# Patient Record
Sex: FEMALE | Race: WHITE | Employment: FULL TIME | URBAN - METROPOLITAN AREA
[De-identification: names, ages, dates, MRNs, and addresses within clinical notes are randomized per-mention and may not be internally consistent; named-entity substitution may affect disease eponyms.]

---

## 2017-01-19 RX ORDER — TRAZODONE HYDROCHLORIDE 100 MG/1
TABLET ORAL
Qty: 60 TABLET | Refills: 0 | Status: SHIPPED | OUTPATIENT
Start: 2017-01-19 | End: 2017-02-24 | Stop reason: SDUPTHER

## 2017-02-24 RX ORDER — TRAZODONE HYDROCHLORIDE 100 MG/1
TABLET ORAL
Qty: 60 TABLET | Refills: 0 | Status: SHIPPED | OUTPATIENT
Start: 2017-02-24 | End: 2017-11-29

## 2018-08-17 ENCOUNTER — HOSPITAL ENCOUNTER (EMERGENCY)
Age: 43
Discharge: HOME | End: 2018-08-17
Attending: EMERGENCY MEDICINE
Payer: MEDICAID

## 2018-08-17 ENCOUNTER — APPOINTMENT (OUTPATIENT)
Dept: ULTRASOUND IMAGING | Age: 43
End: 2018-08-17
Payer: MEDICAID

## 2018-08-17 VITALS
TEMPERATURE: 98.6 F | DIASTOLIC BLOOD PRESSURE: 88 MMHG | HEART RATE: 90 BPM | OXYGEN SATURATION: 100 % | WEIGHT: 180 LBS | BODY MASS INDEX: 25.2 KG/M2 | SYSTOLIC BLOOD PRESSURE: 117 MMHG | RESPIRATION RATE: 14 BRPM | HEIGHT: 71 IN

## 2018-08-17 DIAGNOSIS — R10.9 ABDOMINAL PAIN DURING PREGNANCY, ANTEPARTUM: ICD-10-CM

## 2018-08-17 DIAGNOSIS — N93.9 VAGINA BLEEDING: Primary | ICD-10-CM

## 2018-08-17 DIAGNOSIS — O26.899 ABDOMINAL PAIN DURING PREGNANCY, ANTEPARTUM: ICD-10-CM

## 2018-08-17 LAB
A/G RATIO: 1.5 (ref 1.1–2.2)
ALBUMIN SERPL-MCNC: 4 G/DL (ref 3.4–5)
ALP BLD-CCNC: 89 U/L (ref 40–129)
ALT SERPL-CCNC: 15 U/L (ref 10–40)
ANION GAP SERPL CALCULATED.3IONS-SCNC: 12 MMOL/L (ref 3–16)
AST SERPL-CCNC: 19 U/L (ref 15–37)
BASOPHILS ABSOLUTE: 0.1 K/UL (ref 0–0.2)
BASOPHILS RELATIVE PERCENT: 0.9 %
BILIRUB SERPL-MCNC: <0.2 MG/DL (ref 0–1)
BILIRUBIN URINE: NEGATIVE
BLOOD, URINE: NEGATIVE
BUN BLDV-MCNC: 8 MG/DL (ref 7–20)
CALCIUM SERPL-MCNC: 9 MG/DL (ref 8.3–10.6)
CHLORIDE BLD-SCNC: 105 MMOL/L (ref 99–110)
CLARITY: CLEAR
CO2: 25 MMOL/L (ref 21–32)
COLOR: YELLOW
CREAT SERPL-MCNC: 0.6 MG/DL (ref 0.6–1.1)
EOSINOPHILS ABSOLUTE: 0.2 K/UL (ref 0–0.6)
EOSINOPHILS RELATIVE PERCENT: 2.2 %
GFR AFRICAN AMERICAN: >60
GFR NON-AFRICAN AMERICAN: >60
GLOBULIN: 2.7 G/DL
GLUCOSE BLD-MCNC: 109 MG/DL (ref 70–99)
GLUCOSE URINE: NEGATIVE MG/DL
GONADOTROPIN, CHORIONIC (HCG) QUANT: 564.4 MIU/ML
HCG(URINE) PREGNANCY TEST: POSITIVE
HCT VFR BLD CALC: 40.7 % (ref 36–48)
HEMOGLOBIN: 14.1 G/DL (ref 12–16)
KETONES, URINE: NEGATIVE MG/DL
LEUKOCYTE ESTERASE, URINE: NEGATIVE
LIPASE: 21 U/L (ref 13–60)
LYMPHOCYTES ABSOLUTE: 2.4 K/UL (ref 1–5.1)
LYMPHOCYTES RELATIVE PERCENT: 25.9 %
MCH RBC QN AUTO: 30.5 PG (ref 26–34)
MCHC RBC AUTO-ENTMCNC: 34.7 G/DL (ref 31–36)
MCV RBC AUTO: 87.8 FL (ref 80–100)
MICROSCOPIC EXAMINATION: NORMAL
MONOCYTES ABSOLUTE: 0.5 K/UL (ref 0–1.3)
MONOCYTES RELATIVE PERCENT: 5 %
NEUTROPHILS ABSOLUTE: 6.2 K/UL (ref 1.7–7.7)
NEUTROPHILS RELATIVE PERCENT: 66 %
NITRITE, URINE: NEGATIVE
PDW BLD-RTO: 12.6 % (ref 12.4–15.4)
PH UA: 6
PLATELET # BLD: 261 K/UL (ref 135–450)
PMV BLD AUTO: 7 FL (ref 5–10.5)
POTASSIUM SERPL-SCNC: 3.8 MMOL/L (ref 3.5–5.1)
PROTEIN UA: NEGATIVE MG/DL
RBC # BLD: 4.63 M/UL (ref 4–5.2)
SODIUM BLD-SCNC: 142 MMOL/L (ref 136–145)
SPECIFIC GRAVITY UA: 1.02
TOTAL PROTEIN: 6.7 G/DL (ref 6.4–8.2)
URINE TYPE: NORMAL
UROBILINOGEN, URINE: 0.2 E.U./DL
WBC # BLD: 9.3 K/UL (ref 4–11)

## 2018-08-17 PROCEDURE — 81003 URINALYSIS AUTO W/O SCOPE: CPT

## 2018-08-17 PROCEDURE — 76801 OB US < 14 WKS SINGLE FETUS: CPT

## 2018-08-17 PROCEDURE — 84703 CHORIONIC GONADOTROPIN ASSAY: CPT

## 2018-08-17 PROCEDURE — 80053 COMPREHEN METABOLIC PANEL: CPT

## 2018-08-17 PROCEDURE — 84702 CHORIONIC GONADOTROPIN TEST: CPT

## 2018-08-17 PROCEDURE — 99284 EMERGENCY DEPT VISIT MOD MDM: CPT

## 2018-08-17 PROCEDURE — 83690 ASSAY OF LIPASE: CPT

## 2018-08-17 PROCEDURE — 76817 TRANSVAGINAL US OBSTETRIC: CPT

## 2018-08-17 PROCEDURE — 85025 COMPLETE CBC W/AUTO DIFF WBC: CPT

## 2018-08-17 ASSESSMENT — PAIN DESCRIPTION - PAIN TYPE: TYPE: ACUTE PAIN

## 2018-08-17 ASSESSMENT — PAIN DESCRIPTION - ORIENTATION: ORIENTATION: LOWER;LEFT;RIGHT

## 2018-08-17 ASSESSMENT — ENCOUNTER SYMPTOMS
RESPIRATORY NEGATIVE: 1
ABDOMINAL PAIN: 1

## 2018-08-17 ASSESSMENT — PAIN DESCRIPTION - LOCATION: LOCATION: ABDOMEN

## 2018-08-17 ASSESSMENT — PAIN DESCRIPTION - DESCRIPTORS: DESCRIPTORS: CRAMPING

## 2018-08-17 ASSESSMENT — PAIN SCALES - GENERAL: PAINLEVEL_OUTOF10: 7

## 2018-08-17 NOTE — ED PROVIDER NOTES
[address] ED  eMERGENCY dEPARTMENT eNCOUnter        Pt Name: Celso Houser  MRN: [id number]  [place] 1975  Date of evaluation: 8/17/2018  Provider: Jagjit Armando PA-C  PCP: Chaka Morales DO  ED Attending: Carson Sarah MD    [address] [place] [address]       Chief Complaint   Patient presents with    Abdominal Pain     started 2 months ago, low abd \"in the pouch\". HISTORY OF PRESENT ILLNESS   (Location/Symptom, Timing/Onset, Context/Setting, Quality, Duration, Modifying Factors, Severity)  Note limiting factors. Celso Houser is a 36 y.o. female presents complaining of suprapubic abdominal pain for the past couple of months. Onset has been gradual over the past couple of months. Duration is been intermittent. Last menstrual period was 4 months ago however she states that she has had spotting for the past  2 couple of months. She states he feels as though she is pregnant however she is taking tests at home and they've been negative. She does admit to intermittent nausea but denies any emesis. Nothing seems to make her abdominal pain better. Denies any vaginal discharge or vaginal pain or pelvic pain. She denies any vaginal bleeding at this time. No fevers or chills at this time. Nursing Notes were all reviewed and agreed with or any disagreements were addressed  in the HPI. REVIEW OF SYSTEMS    (2-9 systems for level 4, 10 or more for level 5)     Review of Systems   Constitutional: Negative. HENT: Negative. Respiratory: Negative. Cardiovascular: Negative. Gastrointestinal: Positive for abdominal pain. Genitourinary: Negative. Musculoskeletal: Negative. Skin: Negative. Neurological: Negative. Hematological: Negative. Positives and Pertinent negatives as per HPI. Except as noted above in the ROS, all other systems were reviewed and negative.        PAST MEDICAL HISTORY     Past Medical History:   Diagnosis Date    Anxiety     Back pain     DDD (degenerative disc disease)     Depression     Headache(784.0) 3/25/2011    Herniated disc     lumbar         SURGICAL HISTORY       Past Surgical History:   Procedure Laterality Date    ANUS SURGERY      hemerrhoid surgery x 5     SECTION      x2    HUMERUS FRACTURE SURGERY WITH IMPLANT           CURRENT MEDICATIONS       Discharge Medication List as of 2018  3:23 PM      CONTINUE these medications which have NOT CHANGED    Details   erythromycin (ROMYCIN) 5 MG/GM ophthalmic ointment Apply 1/2 inch to right eye 4 times daily. , Disp-1 Tube, R-0, Print      ondansetron (ZOFRAN ODT) 4 MG disintegrating tablet Take 1 tablet by mouth every 8 hours as needed for Nausea or Vomiting, Disp-10 tablet, R-0Print               ALLERGIES     Tramadol and Vicodin [hydrocodone-acetaminophen]    FAMILY HISTORY       Family History   Problem Relation Age of Onset    Cancer Maternal Grandfather           SOCIAL HISTORY       Social History     Social History    Marital status:      Spouse name: N/A    Number of children: N/A    Years of education: N/A     Social History Main Topics    Smoking status: Never Smoker    Smokeless tobacco: Never Used    Alcohol use No    Drug use: No    Sexual activity: Not Asked     Other Topics Concern    None     Social History Narrative    None       SCREENINGS    [place] Coma Scale  Eye Opening: Spontaneous  Best Verbal Response: Oriented  Best Motor Response: Obeys commands  [place] Coma Scale Score: 15        PHYSICAL EXAM    (up to 7 for level 4, 8 or more for level 5)     ED Triage Vitals [18 1000]   BP Temp Temp Source Pulse Resp SpO2 Height Weight   126/80 98.6 °F (37 °C) Oral 93 16 100 % 5' 11\" (1.803 m) 180 lb (81.6 kg)       Physical Exam   Constitutional: She is oriented to person, place, and time. She appears well-developed and well-nourished. HENT:   Head: Normocephalic and atraumatic.    Nose: Nose normal.   Eyes: Right eye exhibits no discharge. Left eye exhibits no discharge. Neck: Normal range of motion. Neck supple. Cardiovascular: Normal rate, regular rhythm and normal heart sounds. Exam reveals no gallop. No murmur heard. Pulmonary/Chest: Effort normal and breath sounds normal. No respiratory distress. She has no wheezes. She has no rales. She exhibits no tenderness. Abdominal: Soft. Bowel sounds are normal. There is no tenderness. Musculoskeletal: Normal range of motion. She exhibits no deformity. Neurological: She is alert and oriented to person, place, and time. Skin: Skin is warm and dry. She is not diaphoretic. Psychiatric: She has a normal mood and affect. Her behavior is normal.   Nursing note and vitals reviewed.       DIAGNOSTIC RESULTS   LABS:    Labs Reviewed   COMPREHENSIVE METABOLIC PANEL - Abnormal; Notable for the following:        Result Value    Glucose 109 (*)     All other components within normal limits    Narrative:     Performed at:  [place] [address],  [place], [facility]   Phone [phone]   CBC WITH AUTO DIFFERENTIAL    Narrative:     Performed at:  [place] [address],  [place], [facility]   Phone [phone]   URINALYSIS    Narrative:     Performed at:  [place] [address],  [place], [facility]   Phone [phone]   PREGNANCY, URINE    Narrative:     Performed at:  [place] [address],  [place], [facility]   Phone [phone]   LIPASE    Narrative:     Performed at:  [place] [address],  [place], [facility]   Phone [phone]   HCG, QUANTITATIVE, PREGNANCY    Narrative:     Performed at:  [facility],  [place], [facility]   Phone [phone]       All other labs were within normal range or not returned as of this dictation. EKG: All EKG's are interpreted by the Emergency Department Physician who either signs or Co-signs this chart in the absence of a cardiologist.  Please see their note for interpretation of EKG. RADIOLOGY:   Non-plain film images such as CT, Ultrasound and MRI are read by the radiologist. Plain radiographic images are visualized and preliminarily interpreted by the  ED Provider with the below findings:        Interpretation per the Radiologist below, if available at the time of this note:    US OB TRANSVAGINAL   Final Result   Unremarkable appearing uterus and ovaries except for a thickened endometrium. No intrauterine or ectopic pregnancy identified         US OB LESS THAN 14 WEEKS SINGLE OR FIRST GESTATION    (Results Pending)     No results found. PROCEDURES   Unless otherwise noted below, none     Procedures    CRITICAL CARE TIME   N/A    CONSULTS:  None      EMERGENCY DEPARTMENT COURSE and DIFFERENTIAL DIAGNOSIS/MDM:   Vitals:    Vitals:    08/17/18 1000 08/17/18 1448   BP: 126/80 117/88   Pulse: 93 90   Resp: 16 14   Temp: 98.6 °F (37 °C)    TempSrc: Oral    SpO2: 100% 100%   Weight: 180 lb (81.6 kg)    Height: 5' 11\" (1.803 m)        Patient was given the following medications:  Medications - No data to display    She presents with persistent suprapubic abdominal pain for the past couple months. On exam she is alert oriented afebrile hemodyanamically stable pulse ox 100%. Abdomen is soft and nontender without bulges or masses noted. She is drinking soda and eating candy in the room however she does complain of being nauseous. Urine pregnancy is positive. We will get hcg quantative level. Will get a pelvic ultrasound to evaluate for her and rule out ectopic pregnancy. Patient did not wait for U/S results. Patient said she needed to  her son from school. She needs to a repeat hcg in 2 days.  I told her she needs to stay and wait for ultrasound results as well as she needs a follow-up to get her hCG rechecked in 2 days to make sure that it is not a threatened . Patient decided she needed to leave and just wanted the paperwork and left AMA. I had a thorough conversation with her discussing why she should stay and she decided against it. The patient tolerated their visit well. They were seen and evaluated by the attending physician who agreed with the assessment and plan. The patient and / or the family were informed of the results of any tests, a time was given to answer questions, a plan was proposed and they agreed with plan. FINAL IMPRESSION      1. Vagina bleeding    2.  Abdominal pain during pregnancy, antepartum          DISPOSITION/PLAN   DISPOSITION        PATIENT REFERRED TO:  Caro Pacheco DO  [address] [place] [address] [phone]      As needed, If symptoms worsen      DISCHARGE MEDICATIONS:  Discharge Medication List as of 2018  3:23 PM          DISCONTINUED MEDICATIONS:  Discharge Medication List as of 2018  3:23 PM                 (Please note that portions of this note were completed with a voice recognition program.  Efforts were made to edit the dictations but occasionally words are mis-transcribed.)    Leelee Agosto PA-C (electronically signed)           Leelee Agosto PA-C  18 1640

## 2018-08-21 ENCOUNTER — APPOINTMENT (OUTPATIENT)
Dept: CT IMAGING | Age: 43
End: 2018-08-21
Payer: MEDICAID

## 2018-08-21 ENCOUNTER — HOSPITAL ENCOUNTER (EMERGENCY)
Age: 43
Discharge: HOME | End: 2018-08-21
Payer: MEDICAID

## 2018-08-21 VITALS
WEIGHT: 180 LBS | OXYGEN SATURATION: 100 % | SYSTOLIC BLOOD PRESSURE: 118 MMHG | RESPIRATION RATE: 18 BRPM | BODY MASS INDEX: 25.2 KG/M2 | HEART RATE: 82 BPM | HEIGHT: 71 IN | DIASTOLIC BLOOD PRESSURE: 72 MMHG | TEMPERATURE: 98.2 F

## 2018-08-21 DIAGNOSIS — R56.9 WITNESSED SEIZURE-LIKE ACTIVITY (HCC): Primary | ICD-10-CM

## 2018-08-21 LAB
A/G RATIO: 1.3 (ref 1.1–2.2)
ALBUMIN SERPL-MCNC: 3.8 G/DL (ref 3.4–5)
ALP BLD-CCNC: 81 U/L (ref 40–129)
ALT SERPL-CCNC: 20 U/L (ref 10–40)
AMPHETAMINE SCREEN, URINE: ABNORMAL
ANION GAP SERPL CALCULATED.3IONS-SCNC: 10 MMOL/L (ref 3–16)
AST SERPL-CCNC: 23 U/L (ref 15–37)
BARBITURATE SCREEN URINE: ABNORMAL
BASOPHILS ABSOLUTE: 0.1 K/UL (ref 0–0.2)
BASOPHILS RELATIVE PERCENT: 0.9 %
BENZODIAZEPINE SCREEN, URINE: POSITIVE
BILIRUB SERPL-MCNC: <0.2 MG/DL (ref 0–1)
BILIRUBIN URINE: NEGATIVE
BLOOD, URINE: NEGATIVE
BUN BLDV-MCNC: 6 MG/DL (ref 7–20)
CALCIUM SERPL-MCNC: 9 MG/DL (ref 8.3–10.6)
CANNABINOID SCREEN URINE: ABNORMAL
CHLORIDE BLD-SCNC: 103 MMOL/L (ref 99–110)
CLARITY: CLEAR
CO2: 25 MMOL/L (ref 21–32)
COCAINE METABOLITE SCREEN URINE: ABNORMAL
COLOR: YELLOW
CREAT SERPL-MCNC: 0.6 MG/DL (ref 0.6–1.1)
EOSINOPHILS ABSOLUTE: 0.3 K/UL (ref 0–0.6)
EOSINOPHILS RELATIVE PERCENT: 2.3 %
GFR AFRICAN AMERICAN: >60
GFR NON-AFRICAN AMERICAN: >60
GLOBULIN: 3 G/DL
GLUCOSE BLD-MCNC: 79 MG/DL (ref 70–99)
GLUCOSE URINE: NEGATIVE MG/DL
GONADOTROPIN, CHORIONIC (HCG) QUANT: 2709 MIU/ML
HCG(URINE) PREGNANCY TEST: POSITIVE
HCT VFR BLD CALC: 39 % (ref 36–48)
HEMOGLOBIN: 13.5 G/DL (ref 12–16)
KETONES, URINE: NEGATIVE MG/DL
LEUKOCYTE ESTERASE, URINE: NEGATIVE
LYMPHOCYTES ABSOLUTE: 2.2 K/UL (ref 1–5.1)
LYMPHOCYTES RELATIVE PERCENT: 20.8 %
Lab: ABNORMAL
MAGNESIUM: 2 MG/DL (ref 1.8–2.4)
MCH RBC QN AUTO: 29.7 PG (ref 26–34)
MCHC RBC AUTO-ENTMCNC: 34.7 G/DL (ref 31–36)
MCV RBC AUTO: 85.5 FL (ref 80–100)
METHADONE SCREEN, URINE: ABNORMAL
MICROSCOPIC EXAMINATION: NORMAL
MONOCYTES ABSOLUTE: 0.5 K/UL (ref 0–1.3)
MONOCYTES RELATIVE PERCENT: 5.1 %
NEUTROPHILS ABSOLUTE: 7.6 K/UL (ref 1.7–7.7)
NEUTROPHILS RELATIVE PERCENT: 70.9 %
NITRITE, URINE: NEGATIVE
OPIATE SCREEN URINE: ABNORMAL
OXYCODONE URINE: ABNORMAL
PDW BLD-RTO: 12.6 % (ref 12.4–15.4)
PH UA: 6
PH UA: 6
PHENCYCLIDINE SCREEN URINE: ABNORMAL
PLATELET # BLD: 267 K/UL (ref 135–450)
PMV BLD AUTO: 6.7 FL (ref 5–10.5)
POTASSIUM SERPL-SCNC: 3.9 MMOL/L (ref 3.5–5.1)
PROPOXYPHENE SCREEN: ABNORMAL
PROTEIN UA: NEGATIVE MG/DL
RBC # BLD: 4.56 M/UL (ref 4–5.2)
SODIUM BLD-SCNC: 138 MMOL/L (ref 136–145)
SPECIFIC GRAVITY UA: >=1.03
TOTAL PROTEIN: 6.8 G/DL (ref 6.4–8.2)
URINE TYPE: NORMAL
UROBILINOGEN, URINE: 0.2 E.U./DL
WBC # BLD: 10.8 K/UL (ref 4–11)

## 2018-08-21 PROCEDURE — 84702 CHORIONIC GONADOTROPIN TEST: CPT

## 2018-08-21 PROCEDURE — 80307 DRUG TEST PRSMV CHEM ANLYZR: CPT

## 2018-08-21 PROCEDURE — 99283 EMERGENCY DEPT VISIT LOW MDM: CPT

## 2018-08-21 PROCEDURE — 80053 COMPREHEN METABOLIC PANEL: CPT

## 2018-08-21 PROCEDURE — 83735 ASSAY OF MAGNESIUM: CPT

## 2018-08-21 PROCEDURE — 84703 CHORIONIC GONADOTROPIN ASSAY: CPT

## 2018-08-21 PROCEDURE — 70450 CT HEAD/BRAIN W/O DYE: CPT

## 2018-08-21 PROCEDURE — 85025 COMPLETE CBC W/AUTO DIFF WBC: CPT

## 2018-08-21 PROCEDURE — 81003 URINALYSIS AUTO W/O SCOPE: CPT

## 2018-08-21 RX ORDER — BUPRENORPHINE AND NALOXONE 8; 2 MG/1; MG/1
1 FILM, SOLUBLE BUCCAL; SUBLINGUAL 2 TIMES DAILY
COMMUNITY
End: 2018-09-17

## 2018-08-21 RX ORDER — HYDROXYZINE 50 MG/1
TABLET, FILM COATED ORAL
COMMUNITY
Start: 2018-08-15 | End: 2018-09-17

## 2018-08-21 RX ORDER — MIRTAZAPINE 30 MG/1
45 TABLET, FILM COATED ORAL NIGHTLY
COMMUNITY
End: 2018-09-17

## 2018-08-21 ASSESSMENT — ENCOUNTER SYMPTOMS
BACK PAIN: 0
VOMITING: 0
DIARRHEA: 0
WHEEZING: 0
COLOR CHANGE: 0
SHORTNESS OF BREATH: 0
ABDOMINAL PAIN: 0
COUGH: 0
NAUSEA: 0

## 2018-08-21 ASSESSMENT — PAIN DESCRIPTION - ORIENTATION: ORIENTATION: RIGHT

## 2018-08-21 ASSESSMENT — PAIN DESCRIPTION - LOCATION: LOCATION: HIP

## 2018-08-21 ASSESSMENT — PAIN SCALES - GENERAL: PAINLEVEL_OUTOF10: 8

## 2018-08-21 NOTE — ED PROVIDER NOTES
[facility]  ED  eMERGENCY dEPARTMENT eNCOUnter        Pt Name: Markus Wood  MRN: [id number]  [place] 1975  Date of evaluation: 8/21/2018  Provider: MARITZA Quevedo - CNP  PCP: Sawyer Alex, [facility]       Chief Complaint   Patient presents with   [place] Fall     a month ago she had a grand mal seizure, since then she has a hard time remembering anything. Pt states she keeps tripping and falling. She found out she is pregnant 3 days ago at [facility] [employer]. She had an ultrasound there and it was too early to see how long. Pt states she is here now because she has had 3 seizures since and last night she is worried about the baby       HISTORY OF PRESENT ILLNESS   (Location/Symptom, Timing/Onset, Context/Setting, Quality, Duration, Modifying Factors, Severity)  Note limiting factors. Markus Wood is a 36 y.o. female who presents today with seizure like activity, states her equilibrium is off and has been falling. She states that yesterday she had seizure activity and fell off a chair yesterday and landed on her right buttocks, but denies hitting her head. She states she is concerned as she does not have history of seizures until one month ago, as this was her first one. She states that she does not like to see doctors. She also found at she was pregnant, found out at [place] three days ago. In regards to her pregnancy, she is G-9, P-4, A-4; she last LMP 5/14/18 (possibly longer), however is denying any vaginal bleeding or discharge. She denies any abdominal pain or cramping. She denies any new medications. Denies any illicit drugs. Denies any additional aggravating or relieving factors. She presents awake, alert and in no acute distress or toxic appearance. Nursing Notes were all reviewed and agreed with or any disagreements were addressed  in the HPI.     REVIEW OF SYSTEMS    (2-9 systems for level 4, 10 or more for level 5)     Review of Systems Constitutional: Negative for chills and fever. HENT: Negative for congestion. Respiratory: Negative for cough, shortness of breath and wheezing. Cardiovascular: Negative for chest pain. Gastrointestinal: Negative for abdominal pain, diarrhea, nausea and vomiting. Genitourinary: Negative for difficulty urinating, dysuria, vaginal bleeding and vaginal discharge. Musculoskeletal: Negative for back pain. Skin: Negative for color change. Neurological: Positive for seizures. Negative for weakness, numbness and headaches. She states she's been having seizure-like activity, has some intermittent confusion but denies any head injury or trauma. Denies any numbness tingling or paresthesias. Psychiatric/Behavioral: Positive for confusion. Positives and Pertinent negatives as per HPI. Except as noted above in the ROS, all other systems were reviewed and negative. PAST MEDICAL HISTORY     Past Medical History:   Diagnosis Date    Anxiety     Back pain     DDD (degenerative disc disease)     Depression     Headache(784.0) 3/25/2011    Herniated disc     lumbar         SURGICAL HISTORY       Past Surgical History:   Procedure Laterality Date    ANUS SURGERY      hemerrhoid surgery x 5     SECTION      x2    HUMERUS FRACTURE SURGERY WITH IMPLANT           CURRENT MEDICATIONS       Discharge Medication List as of 2018  6:39 PM      CONTINUE these medications which have NOT CHANGED    Details   mirtazapine (REMERON) 30 MG tablet Take 45 mg by mouth nightlyHistorical Med      buprenorphine-naloxone (SUBOXONE) 8-2 MG FILM SL film Place 1 Film under the tongue 2 times daily. Lona Richradson Historical Med      hydrOXYzine (ATARAX) 50 MG tablet Historical Med      ondansetron (ZOFRAN ODT) 4 MG disintegrating tablet Take 1 tablet by mouth every 8 hours as needed for Nausea or Vomiting, Disp-10 tablet, R-0Print               ALLERGIES     Tramadol and Vicodin GCS motor subscore is 6. Patient is awake, alert answering questions properly, neurologically intact no focal deficits. No numbness tingling or paresthesias. No saddle anesthesia. No loss of bowel or bladder control or urinary retention. Skin: Skin is warm and dry. She is not diaphoretic. No pallor. Psychiatric: She has a normal mood and affect. Her behavior is normal.   Nursing note and vitals reviewed. DIAGNOSTIC RESULTS   LABS:    Labs Reviewed   COMPREHENSIVE METABOLIC PANEL - Abnormal; Notable for the following:        Result Value    BUN 6 (*)     All other components within normal limits    Narrative:     Performed at:  [place] [address] [address]   Phone [phone]   [address] - Abnormal; Notable for the following:     Benzodiazepine Screen, Urine POSITIVE (*)     All other components within normal limits    Narrative:     Performed at:  [place] [address] [address]   Phone [phone]   CBC WITH AUTO DIFFERENTIAL    Narrative:     Performed at:  [place] [address] [address]   Phone [phone]   URINALYSIS    Narrative:     Performed at:  [place] [address] [address]   Phone [phone]   PREGNANCY, URINE    Narrative:     Performed at:  [place] [address] [address]   Phone [phone]   HCG, QUANTITATIVE, PREGNANCY    Narrative:     Performed at:  [place] [address] [address]   Phone [phone]   MAGNESIUM    Narrative:     Performed at:  [place] [address] [address]   Phone [phone]       All other labs were within normal range or not returned as of this dictation. EKG:  All EKG's are interpreted by the Emergency Department Physician who either signs or Co-signs this chart in the absence of a cardiologist.  Please see their note for interpretation of EKG. RADIOLOGY:   Non-plain film images such as CT, Ultrasound and MRI are read by the radiologist. Plain radiographic images are visualized and preliminarily interpreted by the  ED Provider with the below findings:        Interpretation per the Radiologist below, if available at the time of this note:    CT HEAD WO CONTRAST   Final Result   No acute intracranial abnormality. No results found. PROCEDURES   Unless otherwise noted below, none     Procedures    CRITICAL CARE TIME   N/A    CONSULTS:  IP CONSULT TO NEUROLOGY      EMERGENCY DEPARTMENT COURSE and DIFFERENTIAL DIAGNOSIS/MDM:   Vitals:    Vitals:    08/21/18 1541 08/21/18 1732 08/21/18 1910   BP: 118/67 112/70 118/72   Pulse: 91 83 82   Resp: 20 18 18   Temp: 98.2 °F (36.8 °C)     TempSrc: Oral     SpO2: 100% 100% 100%   Weight: 180 lb (81.6 kg)     Height: 5' 11\" (1.803 m)         Patient was given the following medications:  Medications - No data to display    Patient presents emergency Department with concerns of seizure-like activity. She states that she's been having shaking episodes and family was concerned that she's having seizures. She denies any history of seizures. She also reports that she was pregnant with her fifth child never has no abdominal pain, cramping, vaginal bleeding or discharge. After evaluation and examination the patient I ordered IV access, blood work, urinalysis and a CT scan of the head. CBC shows no sepsis or anemia. Metabolic panel shows no electrolyte disturbances or renal insufficiency. Liver functions are normal.. HCG is 2709. Magnesium level is normal.  Urinalysis shows no acute infection. Drug screen is positive for benzodiazepines. CT scan of the head shows no acute intracranial abnormality.   Patient has unremarkable neurological exam with no acute focal deficits. I did page neurology on-call. At [address] I spoke with Dr. Dede Jules, neurology on-call, we discussed the patient's case at length and he recommended that the patient follow up with the primary care doctor in addition to Kelsey solo, there was discussion me about the patient being evaluated for the first.  However, Patient has a normal repeat neurological exam. At this time there is no indication of head injury, encephalopathy, TIA/CVA, seizures, dehydration, hypoglycemia, mass lesions, metabolic cause, cardiac arrhythmia, PE. Patient is stable throughout ED course and safe for outpatient follow-up. Therefore, shared medical decision was made between the patient myself and we agreed the patient could be discharged home with outpatient follow-up. Patient made aware of treatment plan and verbally understood and agreed. Patient stable for outpatient follow-up with their family doctor in 24-48 hours. The patient tolerated their visit well. Patient was seen and evaluated by myself and attending physician available for consultation. The patient and / or the family were informed of the results of any tests, a time was given to answer questions, a plan was proposed and they agreed with plan. Patient verbalized understanding of discharge instructions and was discharged from the department stable condition. FINAL IMPRESSION      1. Witnessed seizure-like activity [facility])          DISPOSITION/PLAN   DISPOSITION Decision To Discharge 08/21/2018 06:37:52 PM      PATIENT REFERRED TO:  Bernard Estevez DO  [address] [place] [address] [phone]    Schedule an appointment as soon as possible for a visit in 2 days  you need to follow up with your family doctor ASAP for re-evaluation    [facility]  [address]  [phone]  Schedule an appointment as soon as possible for a

## 2018-09-17 ENCOUNTER — TELEPHONE (OUTPATIENT)
Dept: NEUROLOGY | Age: 43
End: 2018-09-17

## 2018-09-17 ENCOUNTER — INITIAL CONSULT (OUTPATIENT)
Dept: NEUROLOGY | Age: 43
End: 2018-09-17

## 2018-09-17 VITALS
OXYGEN SATURATION: 98 % | DIASTOLIC BLOOD PRESSURE: 77 MMHG | BODY MASS INDEX: 29.82 KG/M2 | HEIGHT: 71 IN | WEIGHT: 213 LBS | SYSTOLIC BLOOD PRESSURE: 126 MMHG | HEART RATE: 67 BPM

## 2018-09-17 DIAGNOSIS — Z3A.10 10 WEEKS GESTATION OF PREGNANCY: ICD-10-CM

## 2018-09-17 DIAGNOSIS — R56.9 NEW ONSET SEIZURE (HCC): Primary | ICD-10-CM

## 2018-09-17 PROCEDURE — 99204 OFFICE O/P NEW MOD 45 MIN: CPT | Performed by: PSYCHIATRY & NEUROLOGY

## 2018-09-17 RX ORDER — DIPHENHYDRAMINE HYDROCHLORIDE 25 MG/1
CAPSULE ORAL
Status: ON HOLD | COMMUNITY
Start: 2018-09-06 | End: 2021-10-30 | Stop reason: HOSPADM

## 2018-09-17 RX ORDER — PROMETHAZINE HYDROCHLORIDE 25 MG/1
TABLET ORAL
COMMUNITY
Start: 2018-09-14

## 2018-09-17 RX ORDER — PNV NO.95/FERROUS FUM/FOLIC AC 28MG-0.8MG
TABLET ORAL
COMMUNITY
Start: 2018-09-06

## 2018-09-17 NOTE — PROGRESS NOTES
The patient is a 36y.o. years old female who was referred by David Rivero DO  for consultation regarding new onset seizure. The patient describes new onset seizure that started in July of this year. The patient was at home back in July when she was witnessed by her family to have a generalized motor seizure with tongue biting and postictal state. Patient has no recollection of the whole event. This was her first seizure. She was seen in the ED and had unremarkable workup with CT head and blood testing. UDS was positive for benzodiazepines. Patient then discovered that she was pregnant. She is scheduled for ultrasound next week. Seizures description:  Aura: none  Semiology: GTC with tongue biting  Frequency: once  Duration: minutes. Post ictal state: yes  Triggers: no  History of febrile convulsions: no   History of major childhood illnesses or febrile illnesses: no  Prior MRI:  No. She had normal CT head last month  EEG: no  AED: no  Family history of epilepsy: no  history of major head trauma: yes  Use of ETOH or drugs: no  Sleep disturbance: no  History of status: no  Driving: yes  Depression or STEWART: no  Job: yes. Desk job. Other comorbid diseases: She is currently pregnant in her first trimester       Past Medical History:   Diagnosis Date    Anxiety     Back pain     DDD (degenerative disc disease)     Depression     Headache(784.0) 3/25/2011    Herniated disc     lumbar    New onset seizure ([address] 9/17/2018     Prior to Visit Medications    Medication Sig Taking?  Authorizing Provider   Prenatal Vit-Fe Fumarate-FA (PRENATAL VITAMINS) 28-0.8 MG TABS  Yes Historical Provider, MD   promethazine (PHENERGAN) 25 MG tablet  Yes Historical Provider, MD   vitamin B-6 (PYRIDOXINE) 25 MG tablet  Yes Historical Provider, MD     Allergies   Allergen Reactions    Tramadol     Vicodin [Hydrocodone-Acetaminophen] Nausea And Vomiting     Social History   Substance Use Topics    Smoking status: Never Smoker    Smokeless tobacco: Never Used    Alcohol use No     Family History   Problem Relation Age of Onset    Cancer Maternal Grandfather      Past Surgical History:   Procedure Laterality Date    ANUS SURGERY      hemerrhoid surgery x 5     SECTION      x2    HUMERUS FRACTURE SURGERY WITH IMPLANT           ROS: A 10-12 system review of constitutional, cardiovascular, respiratory, musculoskeletal, endocrine, skin, SHEENT, genitourinary, psychiatric and neurologic systems was obtained and updated today and is unremarkable except as mentioned in my HPI    Exam:  Constitutional:   Vitals:    18 1148   BP: 126/77   Pulse: 67   SpO2: 98%   Weight: 213 lb (96.6 kg)   Height: 5' 11\" (1.803 m)         General appearance: well-nourished. Eye: No icterus. No blurring of optic disc. Neck: supple  Cardiovascular: No carotid bruit. Heart: S1, S2         No lower leg edema with good pulsation. Mental Status: Oriented to person, place, problem, and time. Fluent speech. Aware of recent and remote event. Good fund of knowledge. Normal attention span and concentration. Cranial Nerves:   II: Visual fields: Full to confrontation  III: Pupils: equal, round, reactive to light  III,IV,VI: Extra Ocular Movements are intact. No nystagmus  V: Facial sensation is intact to pin prick and light touch  VII: Facial strength and movements: intact and symmetric smile,cheek puffing and eyebrow elevation  VIII: Hearing: Intact to finger rub bilaterally  IX: Palate elevation is symmetric  XI: Shoulder shrug is intact  XII: Tongue movements are normal  Musculoskeletal: 5/5 in all 4 extremities. Normal tone. Reflexes: Bilateral biceps 2/4, triceps 2/4, brachial radialis 2/4, knee 2/4 and ankle 2/4. Planters: flexor bilaterally. Coordination: no pronator drift, no dysmetria. Finger nose finger testing within normal limits. Sensation: normal to all modalities.   Gait/Posture: steady    Medical decision making:  I personally reviewed and updated social history, past medical history, medications, allergy, surgical history, and family history as documented in the patient's electronic health records. Labs and/or neuroimaging and other test results reviewed and discussed with the patient. Reviewed notes from other physicians. Provided patient education regarding risk, benefits and treatment options as well as adherence to medication regimen and side effect from these medications. Diagnosis Orders   1. New onset seizure ([address]  EEG   2. 10 weeks gestation of pregnancy       Assessment:  1- Epilepsy syndrome: New onset seizure, possible epilepsy  2- Seizures: GTC  3- Frequency: once  4- Etiology: unknown  5- Related conditions: pregnant   6- EEG: no  7- MRI: no  8- Psychiatric comorbid conditions: STEWART  9- Consideration for epilepsy surgery evaluation: NA      Plan:    I had a long discussion with the patient regarding seizure precautions, risk of falling and injury and risk of epilepsy. We discussed complication and side effect from possible AED during pregnancy. The patient is not allowed to drive for at least 3 months provided she remains seizure-free. Schedule EEG  Schedule MRI after her pregnancy  We'll consider starting AED if she has another seizure or if the EEG is abnormal.  FU with me after the above recommendations. Patient's instruction: The following has been discussed with the patient: epilepsy syndrome, seizure semiology, seizure frequency, MRI orders/results, EEG orders/results, antiepileptic medications, potential medication side effects, as well as seizure precautions and driving precautions as applicable.

## 2018-09-17 NOTE — TELEPHONE ENCOUNTER
Patient called asking for letter stating she is unable to drive for 3 months to be emailed to her at [email or web address]    Done.

## 2018-09-24 ENCOUNTER — HOSPITAL ENCOUNTER (OUTPATIENT)
Dept: NEUROLOGY | Age: 43
Discharge: HOME | End: 2018-09-24
Payer: MEDICAID

## 2018-09-24 PROCEDURE — 95816 EEG AWAKE AND DROWSY: CPT | Performed by: PSYCHIATRY & NEUROLOGY

## 2018-09-24 PROCEDURE — 95816 EEG AWAKE AND DROWSY: CPT

## 2021-10-20 ENCOUNTER — APPOINTMENT (OUTPATIENT)
Dept: GENERAL RADIOLOGY | Age: 46
End: 2021-10-20
Payer: MEDICAID

## 2021-10-20 ENCOUNTER — HOSPITAL ENCOUNTER (EMERGENCY)
Age: 46
Discharge: HOME | End: 2021-10-21
Attending: EMERGENCY MEDICINE
Payer: MEDICAID

## 2021-10-20 DIAGNOSIS — I95.1 ORTHOSTATIC SYNCOPE: ICD-10-CM

## 2021-10-20 DIAGNOSIS — R19.7 DIARRHEA, UNSPECIFIED TYPE: Primary | ICD-10-CM

## 2021-10-20 DIAGNOSIS — E87.6 HYPOKALEMIA: ICD-10-CM

## 2021-10-20 LAB
BASOPHILS ABSOLUTE: 0.1 K/UL (ref 0–0.2)
BASOPHILS RELATIVE PERCENT: 0.8 %
EOSINOPHILS ABSOLUTE: 0.1 K/UL (ref 0–0.6)
EOSINOPHILS RELATIVE PERCENT: 0.6 %
HCT VFR BLD CALC: 47.3 % (ref 36–48)
HEMOGLOBIN: 16.1 G/DL (ref 12–16)
LYMPHOCYTES ABSOLUTE: 2.5 K/UL (ref 1–5.1)
LYMPHOCYTES RELATIVE PERCENT: 27 %
MCH RBC QN AUTO: 29.8 PG (ref 26–34)
MCHC RBC AUTO-ENTMCNC: 34.1 G/DL (ref 31–36)
MCV RBC AUTO: 87.6 FL (ref 80–100)
MONOCYTES ABSOLUTE: 0.7 K/UL (ref 0–1.3)
MONOCYTES RELATIVE PERCENT: 7.8 %
NEUTROPHILS ABSOLUTE: 5.8 K/UL (ref 1.7–7.7)
NEUTROPHILS RELATIVE PERCENT: 63.8 %
PDW BLD-RTO: 12.5 % (ref 12.4–15.4)
PLATELET # BLD: 362 K/UL (ref 135–450)
PMV BLD AUTO: 7.7 FL (ref 5–10.5)
RBC # BLD: 5.4 M/UL (ref 4–5.2)
WBC # BLD: 9.1 K/UL (ref 4–11)

## 2021-10-20 PROCEDURE — 96374 THER/PROPH/DIAG INJ IV PUSH: CPT

## 2021-10-20 PROCEDURE — 84702 CHORIONIC GONADOTROPIN TEST: CPT

## 2021-10-20 PROCEDURE — 84484 ASSAY OF TROPONIN QUANT: CPT

## 2021-10-20 PROCEDURE — 83690 ASSAY OF LIPASE: CPT

## 2021-10-20 PROCEDURE — 71045 X-RAY EXAM CHEST 1 VIEW: CPT

## 2021-10-20 PROCEDURE — 93005 ELECTROCARDIOGRAM TRACING: CPT | Performed by: PHYSICIAN ASSISTANT

## 2021-10-20 PROCEDURE — 96361 HYDRATE IV INFUSION ADD-ON: CPT

## 2021-10-20 PROCEDURE — 83735 ASSAY OF MAGNESIUM: CPT

## 2021-10-20 PROCEDURE — 85025 COMPLETE CBC W/AUTO DIFF WBC: CPT

## 2021-10-20 PROCEDURE — 36415 COLL VENOUS BLD VENIPUNCTURE: CPT

## 2021-10-20 PROCEDURE — 80053 COMPREHEN METABOLIC PANEL: CPT

## 2021-10-20 PROCEDURE — 99285 EMERGENCY DEPT VISIT HI MDM: CPT

## 2021-10-20 PROCEDURE — 6360000002 HC RX W HCPCS: Performed by: PHYSICIAN ASSISTANT

## 2021-10-20 RX ORDER — LORAZEPAM 2 MG/ML
1 INJECTION INTRAMUSCULAR ONCE
Status: COMPLETED | OUTPATIENT
Start: 2021-10-20 | End: 2021-10-20

## 2021-10-20 RX ADMIN — LORAZEPAM 1 MG: 2 INJECTION INTRAMUSCULAR; INTRAVENOUS at 23:43

## 2021-10-21 VITALS
TEMPERATURE: 98.7 F | HEART RATE: 53 BPM | DIASTOLIC BLOOD PRESSURE: 116 MMHG | SYSTOLIC BLOOD PRESSURE: 149 MMHG | OXYGEN SATURATION: 100 % | RESPIRATION RATE: 14 BRPM

## 2021-10-21 LAB
A/G RATIO: 1.5 (ref 1.1–2.2)
ALBUMIN SERPL-MCNC: 5 G/DL (ref 3.4–5)
ALP BLD-CCNC: 94 U/L (ref 40–129)
ALT SERPL-CCNC: 13 U/L (ref 10–40)
ANION GAP SERPL CALCULATED.3IONS-SCNC: 11 MMOL/L (ref 3–16)
AST SERPL-CCNC: 15 U/L (ref 15–37)
BILIRUB SERPL-MCNC: 0.8 MG/DL (ref 0–1)
BILIRUBIN URINE: NEGATIVE
BLOOD, URINE: NEGATIVE
BUN BLDV-MCNC: 7 MG/DL (ref 7–20)
CALCIUM SERPL-MCNC: 10.3 MG/DL (ref 8.3–10.6)
CHLORIDE BLD-SCNC: 97 MMOL/L (ref 99–110)
CLARITY: CLEAR
CO2: 28 MMOL/L (ref 21–32)
COLOR: YELLOW
CREAT SERPL-MCNC: 0.7 MG/DL (ref 0.6–1.1)
EKG ATRIAL RATE: 64 BPM
EKG DIAGNOSIS: NORMAL
EKG P AXIS: 28 DEGREES
EKG P-R INTERVAL: 156 MS
EKG Q-T INTERVAL: 428 MS
EKG QRS DURATION: 90 MS
EKG QTC CALCULATION (BAZETT): 441 MS
EKG R AXIS: -45 DEGREES
EKG T AXIS: 115 DEGREES
EKG VENTRICULAR RATE: 64 BPM
GFR AFRICAN AMERICAN: >60
GFR NON-AFRICAN AMERICAN: >60
GLOBULIN: 3.3 G/DL
GLUCOSE BLD-MCNC: 149 MG/DL (ref 70–99)
GLUCOSE URINE: NEGATIVE MG/DL
GONADOTROPIN, CHORIONIC (HCG) QUANT: <5 MIU/ML
KETONES, URINE: ABNORMAL MG/DL
LEUKOCYTE ESTERASE, URINE: NEGATIVE
LIPASE: 58 U/L (ref 13–60)
MAGNESIUM: 2.1 MG/DL (ref 1.8–2.4)
MICROSCOPIC EXAMINATION: ABNORMAL
NITRITE, URINE: NEGATIVE
PH UA: 6 (ref 5–8)
POTASSIUM SERPL-SCNC: 2.9 MMOL/L (ref 3.5–5.1)
PROTEIN UA: NEGATIVE MG/DL
SODIUM BLD-SCNC: 136 MMOL/L (ref 136–145)
SPECIFIC GRAVITY UA: 1.02 (ref 1–1.03)
TOTAL PROTEIN: 8.3 G/DL (ref 6.4–8.2)
TROPONIN: <0.01 NG/ML
URINE TYPE: ABNORMAL
UROBILINOGEN, URINE: 1 E.U./DL

## 2021-10-21 PROCEDURE — 2580000003 HC RX 258: Performed by: PHYSICIAN ASSISTANT

## 2021-10-21 PROCEDURE — 81003 URINALYSIS AUTO W/O SCOPE: CPT

## 2021-10-21 PROCEDURE — 93010 ELECTROCARDIOGRAM REPORT: CPT | Performed by: INTERNAL MEDICINE

## 2021-10-21 PROCEDURE — 6370000000 HC RX 637 (ALT 250 FOR IP): Performed by: PHYSICIAN ASSISTANT

## 2021-10-21 RX ORDER — POTASSIUM CHLORIDE 20 MEQ/1
40 TABLET, EXTENDED RELEASE ORAL ONCE
Status: COMPLETED | OUTPATIENT
Start: 2021-10-21 | End: 2021-10-21

## 2021-10-21 RX ORDER — 0.9 % SODIUM CHLORIDE 0.9 %
1000 INTRAVENOUS SOLUTION INTRAVENOUS ONCE
Status: COMPLETED | OUTPATIENT
Start: 2021-10-21 | End: 2021-10-21

## 2021-10-21 RX ORDER — POTASSIUM CHLORIDE 750 MG/1
10 TABLET, EXTENDED RELEASE ORAL 2 TIMES DAILY
Qty: 20 TABLET | Refills: 0 | Status: SHIPPED | OUTPATIENT
Start: 2021-10-21 | End: 2021-10-31

## 2021-10-21 RX ADMIN — SODIUM CHLORIDE 1000 ML: 9 INJECTION, SOLUTION INTRAVENOUS at 03:17

## 2021-10-21 RX ADMIN — SODIUM CHLORIDE 1000 ML: 9 INJECTION, SOLUTION INTRAVENOUS at 00:27

## 2021-10-21 RX ADMIN — POTASSIUM CHLORIDE 40 MEQ: 20 TABLET, EXTENDED RELEASE ORAL at 01:35

## 2021-10-21 NOTE — ED NOTES
Report received care assumed. Sitting up in bed IVF started reporting feeling fatigued all over, skin warm pink and dry respirations easy and unlabored VS as charted reporting had 2 seizure today and 1 a week ago and states that she been feeling bad since that initial seizure.  No activity of seizure since arrival      [facility]  10/21/21 0031

## 2021-10-21 NOTE — ED PROVIDER NOTES
Emergency Department Attending Provider Note  Location: [address] [place] [address]  ED  10/20/2021     Patient Identification  Joan Nielsen is a 54 y.o. female      Dinora Akers was evaluated in the Emergency Department for fatigue and syncopal episode in setting of diarrhea ongoing for the past 5 days. Physical exam revealed:  Vital signs reviewed  Gen: Alert and oriented, no acute distress  Card: RRR, no murmurs, equal radial pulses  Resp: CBSBL, no wheezes rales or rhonchi  Abd: Soft nontender, nondistended abdomen, no guarding or rebound, no CVA tenderness  Ext: No deformities noted  Neuro: Grossly normal moving extremities equally      EKG Interpretation  Normal sinus rhythm, rate 65, left deviated axis, no evidence of conduction abnormalities, no diagnostic ischemic changes, new T wave inversions lateral leads QTc 441. Patient seen and evaluated. Relevant records reviewed. 69-year-old female who presents with reported seizures versus blacking out episodes in the setting of 5 days of watery nonbloody diarrhea. On exam she is well-appearing no acute distress reassuring vital signs however is noted to be orthostatic requiring 2 L of IV fluid and is no longer orthostatic and feels much better. She has a nonfocal exam. EKG without evidence of arrhythmia. She has remote history of seizures has not been on medication for \"a few years\". I have lower concern for seizure or any intracranial process or space-occupying lesion do not see indication for CT imaging. Her symptoms appear to be consistent with orthostatic syncope in setting of dehydration secondary to diarrhea. Hypokalemia repleted in ED and will cover her with supplementation outpatient. I discussed appropriate follow-up and return precautions with the patient. Patient ambulatory now. She is agreeable to plan expressed understanding of plan.     Although initial history and physical exam information was obtained by MADAY/NPP/MD/DO (who also dictated a record of this visit), I independently examined and evaluated this patient and made all diagnostic, treatment, and disposition decisions. This chart was generated in part by using Dragon Dictation system and may contain errors related to that system including errors in grammar, punctuation, and spelling, as well as words and phrases that may be inappropriate. If there are any questions or concerns please feel free to contact the dictating provider for clarification.      Sarah Walters MD  [address] [place] [address]  10/21/21 0003

## 2021-10-21 NOTE — ED PROVIDER NOTES
[facility] Emergency Department    CHIEF COMPLAINT  Other (states she had a seizure last week and has not been well since. confused with nausea and vomiting)      SHARED SERVICE VISIT  I have seen and evaluated this patient with my supervising physician, Dr. Cyndi Anthony. HISTORY OF PRESENT ILLNESS  Reji Guerin is a 54 y.o. female who presents to the ED complaining of several day history of fatigue. Patient with history of seizures. Reports that she has been out of her medications for at least a month or so. Had seizure last week and states that she has felt unwell since then. Reports headache as well as neck and back pain. States that she is also had chest pain and shortness of breath. Generalized body aches and fatigue. Reports that she has been chilled with fevers. Reports that she has been waking up on the floor. She denies any numbness, tingling, weakness of extremities. Has had dysuria. No vaginal complaints. Reports that she has had cough without productivity. Abdominal pain as well with both nausea, vomiting and diarrhea. Reports that she has been vaccinated against COVID-19. Denies sick contacts. No other complaints, modifying factors or associated symptoms. Nursing notes reviewed.    Past Medical History:   Diagnosis Date    Anxiety     Back pain     DDD (degenerative disc disease)     Depression     Headache(784.0) 3/25/2011    Herniated disc     lumbar    New onset seizure ([facility] [address] 2018     Past Surgical History:   Procedure Laterality Date    ANUS SURGERY      hemerrhoid surgery x 5     SECTION      x2    HUMERUS FRACTURE SURGERY WITH IMPLANT       Family History   Problem Relation Age of Onset    Cancer Maternal Grandfather      Social History     Socioeconomic History    Marital status:      Spouse name: Not on file    Number of children: Not on file    Years of education: Not on file    Highest education level: APPEARANCE: Awake and alert. Cooperative. No acute distress. No hematomas, lesions, lacerations or abrasions. Negative for iraheta signs or raccoon's eyes. HEAD: Normocephalic. Atraumatic. EYES: PERRL. EOM's grossly intact. ENT: Mucous membranes are moist.   NECK: Supple. No JVD. No tracheal tenderness or deviation. No crepitus. No meningismus. HEART: RRR. No murmurs. No chest wall tenderness. LUNGS: Respirations unlabored. CTAB. Good air exchange. Speaking comfortably in full sentences. No wheezing, rhonchi, rales. ABDOMEN: Soft. Non-distended. Non-tender. No guarding or rebound. No midline pulsatile mass. EXTREMITIES: No peripheral edema. No unilateral calf pain, redness or swelling. Moves all extremities equally. All extremities neurovascularly intact. SKIN: Warm and dry. No acute rashes. NEUROLOGICAL: Alert and oriented. CN's 2-12 intact. No gross facial drooping. Strength 5/5, sensation intact. PSYCHIATRIC: Normal mood and affect. RADIOLOGY  XR CHEST PORTABLE    Result Date: 10/21/2021  EXAMINATION: ONE XRAY VIEW OF THE CHEST 10/20/2021 11:46 pm COMPARISON: 12/02/2012 HISTORY: ORDERING SYSTEM PROVIDED HISTORY: loc TECHNOLOGIST PROVIDED HISTORY: Reason for exam:->loc Reason for Exam: LOC FINDINGS: There is no consolidation, pleural effusion, or pneumothorax. Cardiac silhouette is not enlarged and there is no pulmonary vascular congestion. Mediastinum and tio are within normal limits. Part of fixation hardware is seen at the right humerus. .     No acute cardiopulmonary process. ED COURSE/MDM/DISPOSITION  Patient received Ativan for seizure precaution, with good relief. Triage vitals stable. Urinalysis with trace ketonuria. CBC without leukocytosis or anemia. CMP fairly unremarkable outside of hypokalemia at 2.9. She was given 40 milliequivalents of K-Dur.   lipase normal.  Troponin less than 0.01. hCG negative. Magnesium within normal limits. Stable portable chest x-ray. Patient with positive orthostasis. She was given a 1 L IV fluid bolus. On reevaluation she is feeling somewhat better although remains orthostatic. Second liter ordered and pending infusion along with urinalysis still pending. Have discussed case with attending physician regarding reevaluation status post fluid bolus although felt that discharge was reasonable with close outpatient neurology follow-up and continued recommendations to avoid driving until seizure medications have been reestablished. Final Impression  1. Diarrhea, unspecified type    2. Hypokalemia    3.  Orthostatic syncope        Cortney Martinez  10/22/21 [facility] [place]  10/25/21 9775

## 2021-10-21 NOTE — ED NOTES
Orthostatic VS completed.  Reporting improvement in sx denied dizziness or shakiness with standing MD informed okay to be discharged      Kyrie Friend, [address]  10/21/21 7778

## 2021-10-21 NOTE — ED NOTES
Discharged home with instructions and follow up plan of care ambulated out with a steady gait      Karly Grady RN  10/21/21 2434

## 2021-10-25 ENCOUNTER — HOSPITAL ENCOUNTER (EMERGENCY)
Age: 46
Discharge: HOME | End: 2021-10-25
Attending: EMERGENCY MEDICINE
Payer: MEDICAID

## 2021-10-25 ENCOUNTER — APPOINTMENT (OUTPATIENT)
Dept: GENERAL RADIOLOGY | Age: 46
End: 2021-10-25
Payer: MEDICAID

## 2021-10-25 ENCOUNTER — APPOINTMENT (OUTPATIENT)
Dept: CT IMAGING | Age: 46
End: 2021-10-25
Payer: MEDICAID

## 2021-10-25 VITALS
TEMPERATURE: 97.6 F | BODY MASS INDEX: 22.4 KG/M2 | DIASTOLIC BLOOD PRESSURE: 98 MMHG | HEART RATE: 79 BPM | OXYGEN SATURATION: 100 % | WEIGHT: 160 LBS | RESPIRATION RATE: 16 BRPM | HEIGHT: 71 IN | SYSTOLIC BLOOD PRESSURE: 134 MMHG

## 2021-10-25 DIAGNOSIS — R56.9 SEIZURE (HCC): Primary | ICD-10-CM

## 2021-10-25 LAB
A/G RATIO: 1.5 (ref 1.1–2.2)
ALBUMIN SERPL-MCNC: 4.8 G/DL (ref 3.4–5)
ALP BLD-CCNC: 95 U/L (ref 40–129)
ALT SERPL-CCNC: 18 U/L (ref 10–40)
ANION GAP SERPL CALCULATED.3IONS-SCNC: 16 MMOL/L (ref 3–16)
AST SERPL-CCNC: 16 U/L (ref 15–37)
BASE EXCESS VENOUS: 1.8 MMOL/L (ref -3–3)
BASOPHILS ABSOLUTE: 0.1 K/UL (ref 0–0.2)
BASOPHILS RELATIVE PERCENT: 0.8 %
BILIRUB SERPL-MCNC: 0.7 MG/DL (ref 0–1)
BUN BLDV-MCNC: 13 MG/DL (ref 7–20)
CALCIUM SERPL-MCNC: 10.2 MG/DL (ref 8.3–10.6)
CARBOXYHEMOGLOBIN: 1.6 % (ref 0–1.5)
CHLORIDE BLD-SCNC: 103 MMOL/L (ref 99–110)
CO2: 20 MMOL/L (ref 21–32)
CREAT SERPL-MCNC: 0.6 MG/DL (ref 0.6–1.1)
EKG ATRIAL RATE: 74 BPM
EKG DIAGNOSIS: NORMAL
EKG P AXIS: 21 DEGREES
EKG P-R INTERVAL: 154 MS
EKG Q-T INTERVAL: 404 MS
EKG QRS DURATION: 90 MS
EKG QTC CALCULATION (BAZETT): 448 MS
EKG R AXIS: -43 DEGREES
EKG T AXIS: 66 DEGREES
EKG VENTRICULAR RATE: 74 BPM
EOSINOPHILS ABSOLUTE: 0.1 K/UL (ref 0–0.6)
EOSINOPHILS RELATIVE PERCENT: 0.7 %
GFR AFRICAN AMERICAN: >60
GFR NON-AFRICAN AMERICAN: >60
GLOBULIN: 3.3 G/DL
GLUCOSE BLD-MCNC: 124 MG/DL (ref 70–99)
HCO3 VENOUS: 22.1 MMOL/L (ref 23–29)
HCT VFR BLD CALC: 46.5 % (ref 36–48)
HEMOGLOBIN: 16 G/DL (ref 12–16)
LYMPHOCYTES ABSOLUTE: 2.4 K/UL (ref 1–5.1)
LYMPHOCYTES RELATIVE PERCENT: 27.3 %
MCH RBC QN AUTO: 30.4 PG (ref 26–34)
MCHC RBC AUTO-ENTMCNC: 34.4 G/DL (ref 31–36)
MCV RBC AUTO: 88.2 FL (ref 80–100)
METHEMOGLOBIN VENOUS: 0.2 %
MONOCYTES ABSOLUTE: 0.5 K/UL (ref 0–1.3)
MONOCYTES RELATIVE PERCENT: 5.7 %
NEUTROPHILS ABSOLUTE: 5.7 K/UL (ref 1.7–7.7)
NEUTROPHILS RELATIVE PERCENT: 65.5 %
O2 SAT, VEN: 97 %
O2 THERAPY: ABNORMAL
PCO2, VEN: 25.4 MMHG (ref 40–50)
PDW BLD-RTO: 12.9 % (ref 12.4–15.4)
PH VENOUS: 7.56 (ref 7.35–7.45)
PLATELET # BLD: 315 K/UL (ref 135–450)
PMV BLD AUTO: 7.6 FL (ref 5–10.5)
PO2, VEN: 78.5 MMHG (ref 25–40)
POTASSIUM REFLEX MAGNESIUM: 3.7 MMOL/L (ref 3.5–5.1)
PRO-BNP: 21 PG/ML (ref 0–124)
RBC # BLD: 5.28 M/UL (ref 4–5.2)
SODIUM BLD-SCNC: 139 MMOL/L (ref 136–145)
TCO2 CALC VENOUS: 23 MMOL/L
TOTAL PROTEIN: 8.1 G/DL (ref 6.4–8.2)
TROPONIN: <0.01 NG/ML
WBC # BLD: 8.8 K/UL (ref 4–11)

## 2021-10-25 PROCEDURE — 82803 BLOOD GASES ANY COMBINATION: CPT

## 2021-10-25 PROCEDURE — 80053 COMPREHEN METABOLIC PANEL: CPT

## 2021-10-25 PROCEDURE — 6370000000 HC RX 637 (ALT 250 FOR IP): Performed by: EMERGENCY MEDICINE

## 2021-10-25 PROCEDURE — 83880 ASSAY OF NATRIURETIC PEPTIDE: CPT

## 2021-10-25 PROCEDURE — 84484 ASSAY OF TROPONIN QUANT: CPT

## 2021-10-25 PROCEDURE — 71045 X-RAY EXAM CHEST 1 VIEW: CPT

## 2021-10-25 PROCEDURE — 96374 THER/PROPH/DIAG INJ IV PUSH: CPT

## 2021-10-25 PROCEDURE — 96361 HYDRATE IV INFUSION ADD-ON: CPT

## 2021-10-25 PROCEDURE — 2580000003 HC RX 258: Performed by: EMERGENCY MEDICINE

## 2021-10-25 PROCEDURE — 6360000002 HC RX W HCPCS: Performed by: EMERGENCY MEDICINE

## 2021-10-25 PROCEDURE — 99285 EMERGENCY DEPT VISIT HI MDM: CPT

## 2021-10-25 PROCEDURE — 85025 COMPLETE CBC W/AUTO DIFF WBC: CPT

## 2021-10-25 PROCEDURE — 70450 CT HEAD/BRAIN W/O DYE: CPT

## 2021-10-25 PROCEDURE — 93010 ELECTROCARDIOGRAM REPORT: CPT | Performed by: INTERNAL MEDICINE

## 2021-10-25 PROCEDURE — 93005 ELECTROCARDIOGRAM TRACING: CPT | Performed by: EMERGENCY MEDICINE

## 2021-10-25 RX ORDER — DIVALPROEX SODIUM 250 MG/1
250 TABLET, DELAYED RELEASE ORAL 2 TIMES DAILY
Qty: 90 TABLET | Refills: 3 | Status: SHIPPED | OUTPATIENT
Start: 2021-10-25

## 2021-10-25 RX ORDER — LORAZEPAM 2 MG/ML
1 INJECTION INTRAMUSCULAR ONCE
Status: COMPLETED | OUTPATIENT
Start: 2021-10-25 | End: 2021-10-25

## 2021-10-25 RX ORDER — SODIUM CHLORIDE, SODIUM LACTATE, POTASSIUM CHLORIDE, AND CALCIUM CHLORIDE .6; .31; .03; .02 G/100ML; G/100ML; G/100ML; G/100ML
1000 INJECTION, SOLUTION INTRAVENOUS ONCE
Status: COMPLETED | OUTPATIENT
Start: 2021-10-25 | End: 2021-10-25

## 2021-10-25 RX ORDER — DIVALPROEX SODIUM 250 MG/1
250 TABLET, DELAYED RELEASE ORAL ONCE
Status: COMPLETED | OUTPATIENT
Start: 2021-10-25 | End: 2021-10-25

## 2021-10-25 RX ADMIN — DIVALPROEX SODIUM 250 MG: 250 TABLET, DELAYED RELEASE ORAL at 09:08

## 2021-10-25 RX ADMIN — LORAZEPAM 1 MG: 2 INJECTION INTRAMUSCULAR; INTRAVENOUS at 07:33

## 2021-10-25 RX ADMIN — SODIUM CHLORIDE, POTASSIUM CHLORIDE, SODIUM LACTATE AND CALCIUM CHLORIDE 1000 ML: 600; 310; 30; 20 INJECTION, SOLUTION INTRAVENOUS at 07:32

## 2021-10-25 ASSESSMENT — PAIN DESCRIPTION - LOCATION: LOCATION: BACK

## 2021-10-25 ASSESSMENT — PAIN SCALES - GENERAL: PAINLEVEL_OUTOF10: 5

## 2021-10-25 ASSESSMENT — PAIN DESCRIPTION - PAIN TYPE: TYPE: ACUTE PAIN

## 2021-10-25 NOTE — ED NOTES
Bed: 14  Expected date:   Expected time:   Means of arrival:   Comments:  [address] RN  10/25/21 8138

## 2021-10-25 NOTE — ED NOTES
Pt arrives to the ED with chief c/o of possible seizure, pt sts hx of seizures however not taking any medications for seizures at this time, pt had taken Depakote at some point but hasnt had Depakote in a while, pt was not very forthcoming while answering questions, pt very fidgeting while in bed, Dr. Shantel Trinidad is aware   Pt arrived to room with 15year old son with her, pt was informed of our policies, pt sts that his father is going to be coming to pick him up,        Ángel Gonzalez, RN  10/25/21 1067

## 2021-10-25 NOTE — ED PROVIDER NOTES
CHIEF COMPLAINT  Seizures (hx of seizures, pt unsure if she had one recently, not on any medications, pt \"i feel like im gonna pass out\" was seen her recently )      [address] Terri Lynch is a 54 y.o. female with a history of anxiety, degenerative disc disease, depression, seizure disorder who presents emergency department for evaluation of dizziness. Patient was recently seen in the emergency department for evaluation of similar complaints. Patient states that she has history of seizures. She states that she was previously on Depakote for seizures but has not taken this in multiple years. She states that she has tried to deal with the seizures on her own without taking medication. She is not seen a neurologist recently. She is concerned that she may have had a seizure recently but is unsure of when this may have happened. She states that she has generalized fatigue. She states that she feels \"unwell. \"  Denies chest pain, difficulty breathing. No fevers. She states that she was concerned regarding the symptoms and called the ambulance this morning. Has not followed up with neurology since her last emergency department visit 5 days ago. Per emergency department visit 5 days ago was consistent with orthostatic hypotension, volume depletion. She had mild hypokalemia. No other complaints, modifying factors or associated symptoms.        Past Medical History:   Diagnosis Date    Anxiety     Back pain     DDD (degenerative disc disease)     Depression     Headache(784.0) 3/25/2011    Herniated disc     lumbar    New onset seizure ([facility] [address] 2018     Past Surgical History:   Procedure Laterality Date    ANUS SURGERY      hemerrhoid surgery x 5     SECTION      x2    HUMERUS FRACTURE SURGERY WITH IMPLANT       Family History   Problem Relation Age of Onset    Cancer Maternal Grandfather      Social History     Socioeconomic History    Marital status:  Spouse name: Not on file    Number of children: Not on file    Years of education: Not on file    Highest education level: Not on file   Occupational History    Not on file   Tobacco Use    Smoking status: Never Smoker    Smokeless tobacco: Never Used   Substance and Sexual Activity    Alcohol use: No    Drug use: No    Sexual activity: Not on file   Other Topics Concern    Not on file   Social History Narrative    Not on file     Social Determinants of Health     Financial Resource Strain:     Difficulty of Paying Living Expenses:    Food Insecurity:     Worried About Running Out of Food in the Last Year:     [address] in the Last Year:    Transportation Needs:     Lack of Transportation (Medical):  Lack of Transportation (Non-Medical):    Physical Activity:     Days of Exercise per Week:     Minutes of Exercise per Session:    Stress:     Feeling of Stress :    Social Connections:     Frequency of Communication with Friends and Family:     Frequency of Social Gatherings with Friends and Family:     Attends Orthodox Services:     Active Member of Clubs or Organizations:     Attends Club or Organization Meetings:     Marital Status:    Intimate Partner Violence:     Fear of Current or Ex-Partner:     Emotionally Abused:     Physically Abused:     Sexually Abused:      No current facility-administered medications for this encounter.      Current Outpatient Medications   Medication Sig Dispense Refill    divalproex (DEPAKOTE) 250 MG DR tablet Take 1 tablet by mouth 2 times daily 90 tablet 3    potassium chloride (KLOR-CON M) 10 MEQ extended release tablet Take 1 tablet by mouth 2 times daily for 10 days 20 tablet 0    Prenatal Vit-Fe Fumarate-FA (PRENATAL VITAMINS) 28-0.8 MG TABS       promethazine (PHENERGAN) 25 MG tablet       vitamin B-6 (PYRIDOXINE) 25 MG tablet        Allergies   Allergen Reactions    Tramadol     Vicodin [Hydrocodone-Acetaminophen] Nausea And Vomiting Value Ref Range    Sodium 139 136 - 145 mmol/L    Potassium reflex Magnesium 3.7 3.5 - 5.1 mmol/L    Chloride 103 99 - 110 mmol/L    CO2 20 (L) 21 - 32 mmol/L    Anion Gap 16 3 - 16    Glucose 124 (H) 70 - 99 mg/dL    BUN 13 7 - 20 mg/dL    CREATININE 0.6 0.6 - 1.1 mg/dL    GFR Non-African American >60 >60    GFR African American >60 >60    Calcium 10.2 8.3 - 10.6 mg/dL    Total Protein 8.1 6.4 - 8.2 g/dL    Albumin 4.8 3.4 - 5.0 g/dL    Albumin/Globulin Ratio 1.5 1.1 - 2.2    Total Bilirubin 0.7 0.0 - 1.0 mg/dL    Alkaline Phosphatase 95 40 - 129 U/L    ALT 18 10 - 40 U/L    AST 16 15 - 37 U/L    Globulin 3.3 Not Established g/dL   Troponin   Result Value Ref Range    Troponin <0.01 <0.01 ng/mL   Brain Natriuretic Peptide   Result Value Ref Range    Pro-BNP 21 0 - 124 pg/mL   Blood Gas, Venous   Result Value Ref Range    pH, Anatoly 7.557 (H) 7.350 - 7.450    pCO2, Anatoly 25.4 (L) 40.0 - 50.0 mmHg    pO2, Anatoly 78.5 (H) 25 - 40 mmHg    HCO3, Venous 22.1 (L) 23.0 - 29.0 mmol/L    Base Excess, Anatoly 1.8 -3.0 - 3.0 mmol/L    O2 Sat, Anatoly 97 Not Established %    Carboxyhemoglobin 1.6 (H) 0.0 - 1.5 %    MetHgb, Anatoly 0.2 <1.5 %    TC02 (Calc), Anatoly 23 Not Established mmol/L    O2 Therapy Unknown    EKG 12 Lead   Result Value Ref Range    Ventricular Rate 74 BPM    Atrial Rate 74 BPM    P-R Interval 154 ms    QRS Duration 90 ms    Q-T Interval 404 ms    QTc Calculation (Bazett) 448 ms    P Axis 21 degrees    R Axis -43 degrees    T Axis 66 degrees    Diagnosis       Normal sinus rhythmLeft axis deviationAbnormal ECGWhen compared with ECG of 20-OCT-2021 23:36,No significant change was found       EKG  The Ekg interpreted by myself in the emergency department in the absence of a cardiologist.  normal sinus rhythm with a rate of 74  Axis is   Left axis deviation  QTc is  within an acceptable range  Intervals and Durations are unremarkable. No specific ST-T wave changes appreciated. No evidence of acute ischemia.    No significant change from prior EKG dated 10/20/21      RADIOLOGY  X-RAYS:  I have reviewed radiologic plain film image(s). ALL OTHER NON-PLAIN FILM IMAGES SUCH AS CT, ULTRASOUND AND MRI HAVE BEEN READ BY THE RADIOLOGIST. CT Head WO Contrast   Final Result   No acute intracranial abnormality. XR CHEST PORTABLE   Final Result   Normal appearing chest.  No acute abnormality. No change from the prior   study. ED COURSE/MDM  Patient seen and evaluated. Old records reviewed. Labs and imaging reviewed and results discussed with patient. This is a 62-year-old female presents for evaluation of near syncope, potential seizure. Patient is moving her head from side to side, restless. She is fully awake, conversant during this and this does not of her present seizure activity. She has no focal neurological deficits. Will obtain laboratory evaluation, CT head, chest x-ray, EKG for evaluation of near syncope, dizziness. Patient was noted to be increasingly agitated after initial evaluation was given 1 mg Ativan. Seizure precautions in place. I discussed with her that we can reinitiate Depakote therapy with concerns for potential seizure. I advised her that she needs to follow-up with neurology as soon as possible. Laboratory evaluation was unremarkable, stable hemoglobin, white blood cell count within normal limits. Potassium normal.  CT head without acute process. Chest x-ray is normal.  Patient has had no seizure activity while being observed in the emergency department. Patient was given initial dose of Depakote and advised to take this on a daily basis as prescribed. She was provided with referrals to PCP and neurology and instructed to make appointments with these specialist as soon as possible.   I am concerned that there could be a psychiatric component to her presentation as well and Depakote may help with mood disturbance however this will require follow-up by PCP and potential psychiatry for continued dose titration. Depakote was chosen as she previously has tolerated and used to be on Depakote for control of seizure disorder. Patient is agreeable with this plan. The patient will be discharged from the emergency department. The patient was counseled on their diagnosis and any medications prescribed. They were advised on the need for PCP followup. They were counseled on the need to return to the emergency department if any of their symptoms were to worsen, change or have any other concerns. Discharged in stable condition. CLINICAL IMPRESSION  1. Seizure ([address]        Blood pressure (!) 134/98, pulse 79, temperature 97.6 °F (36.4 °C), temperature source Oral, resp. rate 16, height 5' 11\" (1.803 m), weight 160 lb (72.6 kg), SpO2 100 %, unknown if currently breastfeeding. DISPOSITION  Nida Sinclair was discharged to home in stable condition. This chart was generated in part by using Dragon Dictation system and may contain errors related to that system including errors in grammar, punctuation, and spelling, as well as words and phrases that may be inappropriate. If there are any questions or concerns please feel free to contact the dictating provider for clarification.      Marcos Clements MD  10/25/21 0408

## 2021-10-27 ENCOUNTER — APPOINTMENT (OUTPATIENT)
Dept: CT IMAGING | Age: 46
End: 2021-10-27
Payer: MEDICAID

## 2021-10-27 ENCOUNTER — HOSPITAL ENCOUNTER (OUTPATIENT)
Age: 46
Setting detail: OBSERVATION
Discharge: HOME | End: 2021-10-30
Attending: EMERGENCY MEDICINE | Admitting: INTERNAL MEDICINE
Payer: MEDICAID

## 2021-10-27 ENCOUNTER — APPOINTMENT (OUTPATIENT)
Dept: GENERAL RADIOLOGY | Age: 46
End: 2021-10-27
Payer: MEDICAID

## 2021-10-27 DIAGNOSIS — R55 SYNCOPE AND COLLAPSE: Primary | ICD-10-CM

## 2021-10-27 DIAGNOSIS — E87.6 HYPOKALEMIA: ICD-10-CM

## 2021-10-27 LAB
A/G RATIO: 1.4 (ref 1.1–2.2)
ALBUMIN SERPL-MCNC: 4.3 G/DL (ref 3.4–5)
ALP BLD-CCNC: 86 U/L (ref 40–129)
ALT SERPL-CCNC: 42 U/L (ref 10–40)
ANION GAP SERPL CALCULATED.3IONS-SCNC: 14 MMOL/L (ref 3–16)
AST SERPL-CCNC: 27 U/L (ref 15–37)
BASE EXCESS VENOUS: 1.2 MMOL/L (ref -3–3)
BASOPHILS ABSOLUTE: 0.1 K/UL (ref 0–0.2)
BASOPHILS RELATIVE PERCENT: 0.8 %
BILIRUB SERPL-MCNC: 0.7 MG/DL (ref 0–1)
BILIRUBIN URINE: ABNORMAL
BLOOD, URINE: NEGATIVE
BUN BLDV-MCNC: 12 MG/DL (ref 7–20)
CALCIUM SERPL-MCNC: 9.7 MG/DL (ref 8.3–10.6)
CARBOXYHEMOGLOBIN: 2 % (ref 0–1.5)
CHLORIDE BLD-SCNC: 101 MMOL/L (ref 99–110)
CLARITY: CLEAR
CO2: 22 MMOL/L (ref 21–32)
COLOR: YELLOW
CREAT SERPL-MCNC: 0.6 MG/DL (ref 0.6–1.1)
EOSINOPHILS ABSOLUTE: 0.1 K/UL (ref 0–0.6)
EOSINOPHILS RELATIVE PERCENT: 1 %
GFR AFRICAN AMERICAN: >60
GFR NON-AFRICAN AMERICAN: >60
GLOBULIN: 3 G/DL
GLUCOSE BLD-MCNC: 109 MG/DL (ref 70–99)
GLUCOSE URINE: NEGATIVE MG/DL
HCG QUALITATIVE: NEGATIVE
HCO3 VENOUS: 22.1 MMOL/L (ref 23–29)
HCT VFR BLD CALC: 43.9 % (ref 36–48)
HEMOGLOBIN: 14.9 G/DL (ref 12–16)
KETONES, URINE: 15 MG/DL
LACTIC ACID: 0.9 MMOL/L (ref 0.4–2)
LEUKOCYTE ESTERASE, URINE: NEGATIVE
LYMPHOCYTES ABSOLUTE: 2.1 K/UL (ref 1–5.1)
LYMPHOCYTES RELATIVE PERCENT: 30.8 %
MAGNESIUM: 2.1 MG/DL (ref 1.8–2.4)
MCH RBC QN AUTO: 29.9 PG (ref 26–34)
MCHC RBC AUTO-ENTMCNC: 33.9 G/DL (ref 31–36)
MCV RBC AUTO: 88.3 FL (ref 80–100)
METHEMOGLOBIN VENOUS: 0.3 %
MICROSCOPIC EXAMINATION: ABNORMAL
MONOCYTES ABSOLUTE: 0.5 K/UL (ref 0–1.3)
MONOCYTES RELATIVE PERCENT: 7.5 %
NEUTROPHILS ABSOLUTE: 4.1 K/UL (ref 1.7–7.7)
NEUTROPHILS RELATIVE PERCENT: 59.9 %
NITRITE, URINE: NEGATIVE
O2 SAT, VEN: 99 %
O2 THERAPY: ABNORMAL
PCO2, VEN: 26.4 MMHG (ref 40–50)
PDW BLD-RTO: 12.6 % (ref 12.4–15.4)
PH UA: 5 (ref 5–8)
PH VENOUS: 7.54 (ref 7.35–7.45)
PLATELET # BLD: 285 K/UL (ref 135–450)
PMV BLD AUTO: 8.2 FL (ref 5–10.5)
PO2, VEN: 140.1 MMHG (ref 25–40)
POTASSIUM SERPL-SCNC: 3.2 MMOL/L (ref 3.5–5.1)
PROTEIN UA: NEGATIVE MG/DL
RAPID INFLUENZA  B AGN: NEGATIVE
RAPID INFLUENZA A AGN: NEGATIVE
RBC # BLD: 4.97 M/UL (ref 4–5.2)
SARS-COV-2, NAAT: NOT DETECTED
SODIUM BLD-SCNC: 137 MMOL/L (ref 136–145)
SPECIFIC GRAVITY UA: 1.01 (ref 1–1.03)
TCO2 CALC VENOUS: 23 MMOL/L
TOTAL PROTEIN: 7.3 G/DL (ref 6.4–8.2)
TROPONIN: <0.01 NG/ML
URINE TYPE: ABNORMAL
UROBILINOGEN, URINE: 0.2 E.U./DL
VALPROIC ACID LEVEL: 5.3 UG/ML (ref 50–100)
WBC # BLD: 6.8 K/UL (ref 4–11)

## 2021-10-27 PROCEDURE — 83605 ASSAY OF LACTIC ACID: CPT

## 2021-10-27 PROCEDURE — 74176 CT ABD & PELVIS W/O CONTRAST: CPT

## 2021-10-27 PROCEDURE — 70450 CT HEAD/BRAIN W/O DYE: CPT

## 2021-10-27 PROCEDURE — 87804 INFLUENZA ASSAY W/OPTIC: CPT

## 2021-10-27 PROCEDURE — 81003 URINALYSIS AUTO W/O SCOPE: CPT

## 2021-10-27 PROCEDURE — 80053 COMPREHEN METABOLIC PANEL: CPT

## 2021-10-27 PROCEDURE — 87635 SARS-COV-2 COVID-19 AMP PRB: CPT

## 2021-10-27 PROCEDURE — 83735 ASSAY OF MAGNESIUM: CPT

## 2021-10-27 PROCEDURE — 2580000003 HC RX 258: Performed by: INTERNAL MEDICINE

## 2021-10-27 PROCEDURE — 2580000003 HC RX 258: Performed by: EMERGENCY MEDICINE

## 2021-10-27 PROCEDURE — 70496 CT ANGIOGRAPHY HEAD: CPT

## 2021-10-27 PROCEDURE — 85025 COMPLETE CBC W/AUTO DIFF WBC: CPT

## 2021-10-27 PROCEDURE — 6370000000 HC RX 637 (ALT 250 FOR IP): Performed by: NURSE PRACTITIONER

## 2021-10-27 PROCEDURE — 2500000003 HC RX 250 WO HCPCS: Performed by: EMERGENCY MEDICINE

## 2021-10-27 PROCEDURE — 6360000002 HC RX W HCPCS: Performed by: EMERGENCY MEDICINE

## 2021-10-27 PROCEDURE — 82803 BLOOD GASES ANY COMBINATION: CPT

## 2021-10-27 PROCEDURE — 80164 ASSAY DIPROPYLACETIC ACD TOT: CPT

## 2021-10-27 PROCEDURE — 96361 HYDRATE IV INFUSION ADD-ON: CPT

## 2021-10-27 PROCEDURE — 84484 ASSAY OF TROPONIN QUANT: CPT

## 2021-10-27 PROCEDURE — 71045 X-RAY EXAM CHEST 1 VIEW: CPT

## 2021-10-27 PROCEDURE — 96376 TX/PRO/DX INJ SAME DRUG ADON: CPT

## 2021-10-27 PROCEDURE — 6370000000 HC RX 637 (ALT 250 FOR IP): Performed by: INTERNAL MEDICINE

## 2021-10-27 PROCEDURE — 6360000004 HC RX CONTRAST MEDICATION: Performed by: EMERGENCY MEDICINE

## 2021-10-27 PROCEDURE — 96375 TX/PRO/DX INJ NEW DRUG ADDON: CPT

## 2021-10-27 PROCEDURE — G0378 HOSPITAL OBSERVATION PER HR: HCPCS

## 2021-10-27 PROCEDURE — 93005 ELECTROCARDIOGRAM TRACING: CPT | Performed by: EMERGENCY MEDICINE

## 2021-10-27 PROCEDURE — 99284 EMERGENCY DEPT VISIT MOD MDM: CPT

## 2021-10-27 PROCEDURE — 6370000000 HC RX 637 (ALT 250 FOR IP): Performed by: EMERGENCY MEDICINE

## 2021-10-27 PROCEDURE — 84703 CHORIONIC GONADOTROPIN ASSAY: CPT

## 2021-10-27 PROCEDURE — 96374 THER/PROPH/DIAG INJ IV PUSH: CPT

## 2021-10-27 PROCEDURE — 6360000002 HC RX W HCPCS: Performed by: INTERNAL MEDICINE

## 2021-10-27 RX ORDER — ONDANSETRON 2 MG/ML
4 INJECTION INTRAMUSCULAR; INTRAVENOUS EVERY 6 HOURS PRN
Status: DISCONTINUED | OUTPATIENT
Start: 2021-10-27 | End: 2021-10-30 | Stop reason: HOSPADM

## 2021-10-27 RX ORDER — ONDANSETRON 2 MG/ML
4 INJECTION INTRAMUSCULAR; INTRAVENOUS ONCE
Status: COMPLETED | OUTPATIENT
Start: 2021-10-27 | End: 2021-10-27

## 2021-10-27 RX ORDER — ONDANSETRON 4 MG/1
4 TABLET, ORALLY DISINTEGRATING ORAL EVERY 8 HOURS PRN
Status: DISCONTINUED | OUTPATIENT
Start: 2021-10-27 | End: 2021-10-30 | Stop reason: HOSPADM

## 2021-10-27 RX ORDER — SODIUM CHLORIDE 0.9 % (FLUSH) 0.9 %
10 SYRINGE (ML) INJECTION PRN
Status: DISCONTINUED | OUTPATIENT
Start: 2021-10-27 | End: 2021-10-30 | Stop reason: HOSPADM

## 2021-10-27 RX ORDER — ACETAMINOPHEN 325 MG/1
650 TABLET ORAL EVERY 6 HOURS PRN
Status: DISCONTINUED | OUTPATIENT
Start: 2021-10-27 | End: 2021-10-30 | Stop reason: HOSPADM

## 2021-10-27 RX ORDER — SODIUM CHLORIDE 9 MG/ML
25 INJECTION, SOLUTION INTRAVENOUS PRN
Status: DISCONTINUED | OUTPATIENT
Start: 2021-10-27 | End: 2021-10-30 | Stop reason: HOSPADM

## 2021-10-27 RX ORDER — ACETAMINOPHEN 650 MG/1
650 SUPPOSITORY RECTAL EVERY 6 HOURS PRN
Status: DISCONTINUED | OUTPATIENT
Start: 2021-10-27 | End: 2021-10-30 | Stop reason: HOSPADM

## 2021-10-27 RX ORDER — POTASSIUM CHLORIDE 20 MEQ/1
40 TABLET, EXTENDED RELEASE ORAL ONCE
Status: COMPLETED | OUTPATIENT
Start: 2021-10-27 | End: 2021-10-27

## 2021-10-27 RX ORDER — SODIUM CHLORIDE 0.9 % (FLUSH) 0.9 %
10 SYRINGE (ML) INJECTION EVERY 12 HOURS SCHEDULED
Status: DISCONTINUED | OUTPATIENT
Start: 2021-10-27 | End: 2021-10-30 | Stop reason: HOSPADM

## 2021-10-27 RX ORDER — LORAZEPAM 0.5 MG/1
0.5 TABLET ORAL ONCE
Status: COMPLETED | OUTPATIENT
Start: 2021-10-27 | End: 2021-10-27

## 2021-10-27 RX ORDER — MORPHINE SULFATE 2 MG/ML
2 INJECTION, SOLUTION INTRAMUSCULAR; INTRAVENOUS ONCE
Status: COMPLETED | OUTPATIENT
Start: 2021-10-27 | End: 2021-10-27

## 2021-10-27 RX ORDER — MAGNESIUM SULFATE IN WATER 40 MG/ML
2000 INJECTION, SOLUTION INTRAVENOUS PRN
Status: DISCONTINUED | OUTPATIENT
Start: 2021-10-27 | End: 2021-10-30 | Stop reason: HOSPADM

## 2021-10-27 RX ORDER — DIVALPROEX SODIUM 250 MG/1
250 TABLET, DELAYED RELEASE ORAL 2 TIMES DAILY
Status: DISCONTINUED | OUTPATIENT
Start: 2021-10-27 | End: 2021-10-30 | Stop reason: HOSPADM

## 2021-10-27 RX ORDER — SODIUM CHLORIDE 9 MG/ML
INJECTION, SOLUTION INTRAVENOUS CONTINUOUS
Status: DISCONTINUED | OUTPATIENT
Start: 2021-10-27 | End: 2021-10-29

## 2021-10-27 RX ORDER — 0.9 % SODIUM CHLORIDE 0.9 %
500 INTRAVENOUS SOLUTION INTRAVENOUS ONCE
Status: COMPLETED | OUTPATIENT
Start: 2021-10-27 | End: 2021-10-27

## 2021-10-27 RX ORDER — ACETAMINOPHEN 325 MG/1
650 TABLET ORAL ONCE
Status: COMPLETED | OUTPATIENT
Start: 2021-10-27 | End: 2021-10-27

## 2021-10-27 RX ORDER — POTASSIUM CHLORIDE 7.45 MG/ML
10 INJECTION INTRAVENOUS PRN
Status: DISCONTINUED | OUTPATIENT
Start: 2021-10-27 | End: 2021-10-30 | Stop reason: HOSPADM

## 2021-10-27 RX ORDER — HYDROCODONE BITARTRATE AND ACETAMINOPHEN 5; 325 MG/1; MG/1
1 TABLET ORAL EVERY 6 HOURS PRN
Status: DISCONTINUED | OUTPATIENT
Start: 2021-10-27 | End: 2021-10-30 | Stop reason: HOSPADM

## 2021-10-27 RX ADMIN — ACETAMINOPHEN 650 MG: 325 TABLET ORAL at 09:11

## 2021-10-27 RX ADMIN — FAMOTIDINE 20 MG: 10 INJECTION, SOLUTION INTRAVENOUS at 09:06

## 2021-10-27 RX ADMIN — ONDANSETRON 4 MG: 2 INJECTION INTRAMUSCULAR; INTRAVENOUS at 09:06

## 2021-10-27 RX ADMIN — ONDANSETRON 4 MG: 2 INJECTION INTRAMUSCULAR; INTRAVENOUS at 15:11

## 2021-10-27 RX ADMIN — MORPHINE SULFATE 2 MG: 2 INJECTION, SOLUTION INTRAMUSCULAR; INTRAVENOUS at 09:10

## 2021-10-27 RX ADMIN — DIVALPROEX SODIUM 250 MG: 250 TABLET, DELAYED RELEASE ORAL at 15:12

## 2021-10-27 RX ADMIN — LORAZEPAM 0.5 MG: 0.5 TABLET ORAL at 22:08

## 2021-10-27 RX ADMIN — IOPAMIDOL 75 ML: 755 INJECTION, SOLUTION INTRAVENOUS at 09:35

## 2021-10-27 RX ADMIN — HYDROCODONE BITARTRATE AND ACETAMINOPHEN 1 TABLET: 5; 325 TABLET ORAL at 15:12

## 2021-10-27 RX ADMIN — Medication 10 ML: at 21:31

## 2021-10-27 RX ADMIN — DIVALPROEX SODIUM 250 MG: 250 TABLET, DELAYED RELEASE ORAL at 22:08

## 2021-10-27 RX ADMIN — SODIUM CHLORIDE: 9 INJECTION, SOLUTION INTRAVENOUS at 21:30

## 2021-10-27 RX ADMIN — POTASSIUM CHLORIDE 40 MEQ: 20 TABLET, EXTENDED RELEASE ORAL at 11:51

## 2021-10-27 RX ADMIN — SODIUM CHLORIDE 500 ML: 9 INJECTION, SOLUTION INTRAVENOUS at 09:03

## 2021-10-27 ASSESSMENT — PAIN SCALES - GENERAL
PAINLEVEL_OUTOF10: 8

## 2021-10-27 ASSESSMENT — PAIN DESCRIPTION - LOCATION
LOCATION: ABDOMEN
LOCATION: GENERALIZED

## 2021-10-27 ASSESSMENT — PAIN DESCRIPTION - PAIN TYPE
TYPE: ACUTE PAIN
TYPE: ACUTE PAIN

## 2021-10-27 ASSESSMENT — PAIN DESCRIPTION - FREQUENCY: FREQUENCY: CONTINUOUS

## 2021-10-27 ASSESSMENT — PAIN DESCRIPTION - ORIENTATION: ORIENTATION: LOWER

## 2021-10-27 ASSESSMENT — PAIN DESCRIPTION - DESCRIPTORS: DESCRIPTORS: ACHING

## 2021-10-27 NOTE — H&P
Hospital Medicine History & Physical      PCP: Nathaniel Watson DO    Date of Admission: 10/27/2021    Chief Complaint:  \"I passed out 3 times in past 1 week\"    History Of Present Illness:  Patient is a 76-year-old female with past medical history of depression, back pain, new onset seizures who presented to hospital 3 times in 1 week. According to the patient she has been passing out, she mentions she does not feel overall well. Patient mention she is not able to recall the sequence of events, she woke up from sleep and she found herself little confused. She called EMS and was brought to the hospital.  Patient also states she feels nauseated vomiting and having diarrhea, patient mentions she had around more than 10 bowel movements in past 24 hours. Patient otherwise denied chest pain shortness of breath fevers chills. Past Medical History:          Diagnosis Date    Anxiety     Back pain     DDD (degenerative disc disease)     Depression     Headache(784.0) 3/25/2011    Herniated disc     lumbar    New onset seizure ([facility] [address] 2018       Past Surgical History:          Procedure Laterality Date    ANUS SURGERY      hemerrhoid surgery x 5     SECTION      x2    HUMERUS FRACTURE SURGERY WITH IMPLANT         Medications Prior to Admission:      Prior to Admission medications    Medication Sig Start Date End Date Taking?  Authorizing Provider   divalproex (DEPAKOTE) 250 MG DR tablet Take 1 tablet by mouth 2 times daily 10/25/21   Mariaa Hensley MD   potassium chloride (KLOR-CON M) 10 MEQ extended release tablet Take 1 tablet by mouth 2 times daily for 10 days 10/21/21 10/31/21  Radhika Sheffield MD   Prenatal Vit-Fe Fumarate-FA (PRENATAL VITAMINS) 28-0.8 MG TABS  18   Historical Provider, MD   promethazine (PHENERGAN) 25 MG tablet  18   Historical Provider, MD   vitamin B-6 (PYRIDOXINE) 25 MG tablet  18   Historical Provider, MD       Allergies:  Tramadol and Vicodin [hydrocodone-acetaminophen]    Social History:      TOBACCO:   reports that she has never smoked. She has never used smokeless tobacco.  ETOH:   reports no history of alcohol use. Family History:       Reviewed in detail and non contributory          Problem Relation Age of Onset    Cancer Maternal Grandfather        REVIEW OF SYSTEMS:   Pertinent positives as noted in the HPI. All other systems reviewed and negative. PHYSICAL EXAM PERFORMED:    BP (!) 150/102   Pulse 69   Temp 98.4 °F (36.9 °C) (Oral)   Resp 14   Ht 5' 11\" (1.803 m)   Wt 160 lb (72.6 kg)   LMP  (LMP Unknown)   SpO2 100%   BMI 22.32 kg/m²     General appearance:  No apparent distress, cooperative. HEENT:  Normal cephalic, atraumatic without obvious deformity. Conjunctivae/corneas clear. Neck: Supple, with full range of motion. No cervical lymphadenopathy  Respiratory:  Normal respiratory effort. Clear to auscultation, bilaterally without Rales/Wheezes/Rhonchi. Cardiovascular:  Regular rate and rhythm with normal S1/S2 without murmurs, rubs or gallops. Abdomen: Soft, non-tender, non-distended, normal bowel sounds. Musculoskeletal:  No edema noted bilaterally. No tenderness on palpation   Skin: no rash visible  Neurologic:  Neurologically intact without any focal sensory/motor deficits. grossly non-focal.  Psychiatric:  Alert and oriented, normal mood  Peripheral Pulses: +2 palpable, equal bilaterally       Labs:     Recent Labs     10/25/21  0723 10/27/21  0615   WBC 8.8 6.8   HGB 16.0 14.9   HCT 46.5 43.9    285     Recent Labs     10/25/21  0723 10/27/21  0615    137   K 3.7 3.2*    101   CO2 20* 22   BUN 13 12   CREATININE 0.6 0.6   CALCIUM 10.2 9.7     Recent Labs     10/25/21  0723 10/27/21  0615   AST 16 27   ALT 18 42*   BILITOT 0.7 0.7   ALKPHOS 95 86     No results for input(s): INR in the last 72 hours.   Recent Labs     10/25/21  0723 10/27/21  0615   TROPONINI <0.01 <0.01       Urinalysis:      Lab Results   Component Value Date    NITRU Negative 10/27/2021    WBCUA 15-20 05/09/2010    BACTERIA 3+ 05/09/2010    BLOODU Negative 10/27/2021    SPECGRAV 1.010 10/27/2021    GLUCOSEU Negative 10/27/2021    GLUCOSEU Neg 05/09/2010       Radiology:       CTA HEAD NECK W CONTRAST   Final Result   Unremarkable CTA of the head and neck. CT ABDOMEN PELVIS WO CONTRAST Additional Contrast? None   Final Result   No acute findings to the abdomen or pelvis. No findings to explain the   patient's symptoms. CT HEAD WO CONTRAST   Final Result   No acute intracranial abnormality. Mild paranasal sinus disease         XR CHEST PORTABLE   Final Result   No evidence of acute cardiopulmonary process. Active Hospital Problems    Diagnosis Date Noted    Syncope and collapse [R55] 10/27/2021       Patient is a 58-year-old female with past medical history of depression, back pain, new onset seizures who presented to hospital 3 times in 1 week. According to the patient she has been passing out, she mentions she does not feel overall well. Patient mention she is not able to recall the sequence of events, she woke up from sleep and she found herself little confused. She called EMS and was brought to the hospital.  Patient also states she feels nauseated vomiting and having diarrhea, patient mentions she had around more than 10 bowel movements in past 24 hours. Patient otherwise denied chest pain shortness of breath fevers chills.     Assessment  Syncope, fall, rule out breakthrough seizures  Nausea vomiting diarrhea, suspect viral gastritis  History of depression  New onset seizures    Plan  We will start gentle IV fluid therapy, check CK level, CT head performed in ED-unremarkable for acute intracranial process, CT abdomen and pelvis performed in ED-unremarkable, CT head and neck-negative, will consult neurology for further evaluation, will defer EEG to neurology, resume home Depakote  DVT prophylaxis-Lovenox  Zofran as needed nausea vomiting  Diet: ADULT DIET; Regular  Code Status: Full Code    PT/OT Eval Status: ordered    Dispo - pending clinical improvement       Angelica Nair MD    The note was completed using EMR and Dragon dictation system. Every effort was made to ensure accuracy; however, inadvertent computerized transcription errors may be present. Thank you Sussy Rocha DO for the opportunity to be involved in this patient's care. If you have any questions or concerns please feel free to contact me at [phone].     Angelica Nair MD

## 2021-10-27 NOTE — ED TRIAGE NOTES
Presents with multiple complaints. States possible syncope this am or a seizure. Per EMS she called with the complaint was not post ictal when they arrived. States awoke in a pool of \"water\" unsure what happened. states having diarrhea, nausea and vomiting >10 times. States has been seen multiple times and sx are not improving.  Awake alert and orientated skin warm pink and dry respirations easy and unlabored

## 2021-10-27 NOTE — ED PROVIDER NOTES
EMERGENCY DEPARTMENT ENCOUNTER        Pt Name: Joe Bowser  MRN: [id number]  [place] 1975  Date of evaluation: 10/27/2021  Provider: Daniel Fitch MD  PCP: Yaneth Krishna, [address] [place] [address]       Chief Complaint   Patient presents with    Loss of Consciousness       HISTORY OFPRESENT ILLNESS   (Location/Symptom, Timing/Onset, Context/Setting, Quality, Duration, Modifying Factors,Severity)  Note limiting factors. Joe Bowser is a 54 y.o. female presenting today due to concern for reportedly having a syncopal event this morning where she was found on the ground in a \"pool of water\" and complaining of a headache along with neck pain, chest pain and abdominal pain. She also complains of low back pain. She reports that this has been happening all week. She was seen 2 other times this week for similar complaints and states she is feeling worse. She denies any dysuria or fever. She has had some nausea with vomiting. She denies any unilateral numbness or weakness but feels weak all over. She is supposed to be taking Depakote for reported seizure history. She is unsure if she had a syncopal event or seizure this morning. She denies biting her tongue. She reports having chest pain starting this morning but has had intermittent shortness of breath over the last week. She does report having loss of taste and smell. Due to worsening generalized fatigue and weakness along with waking up on the floor and being unsure why this was the case, she came to the ED for further evaluation. Her pain is currently an 8 out of 10. The headache is not the worst of her life. REVIEW OF SYSTEMS    (2-9 systems for level 4, 10 or more for level 5)     Review of Systems   Constitutional: Positive for activity change, appetite change, chills and fatigue. Negative for diaphoresis and fever. HENT: Positive for congestion and sore throat. Eyes: Negative for visual disturbance. Respiratory: Positive for cough, chest tightness and shortness of breath. Cardiovascular: Positive for chest pain. Negative for leg swelling. Gastrointestinal: Positive for abdominal pain, nausea and vomiting. Negative for diarrhea. Genitourinary: Negative for dysuria and flank pain. Musculoskeletal: Positive for back pain (low back, chronic, no new changes today), gait problem (due to generalized weakness) and neck pain. Negative for arthralgias and joint swelling. Skin: Negative for color change and wound. Neurological: Positive for seizures (possible), syncope (possible), weakness (generalized), light-headedness and headaches (severe, not worst). Negative for numbness. Psychiatric/Behavioral: Negative for confusion. The patient is nervous/anxious. Positives and Pertinent negatives as per HPI.       PASTMEDICAL HISTORY     Past Medical History:   Diagnosis Date    Anxiety     Back pain     DDD (degenerative disc disease)     Depression     Headache(784.0) 3/25/2011    Herniated disc     lumbar    New onset seizure ([facility] [address] 2018         SURGICAL HISTORY       Past Surgical History:   Procedure Laterality Date    ANUS SURGERY      hemerrhoid surgery x 5     SECTION      x2    HUMERUS FRACTURE SURGERY WITH IMPLANT           CURRENT MEDICATIONS       Current Discharge Medication List      CONTINUE these medications which have NOT CHANGED    Details   divalproex (DEPAKOTE) 250 MG DR tablet Take 1 tablet by mouth 2 times daily  Qty: 90 tablet, Refills: 3      potassium chloride (KLOR-CON M) 10 MEQ extended release tablet Take 1 tablet by mouth 2 times daily for 10 days  Qty: 20 tablet, Refills: 0      Prenatal Vit-Fe Fumarate-FA (PRENATAL VITAMINS) 28-0.8 MG TABS       promethazine (PHENERGAN) 25 MG tablet       vitamin B-6 (PYRIDOXINE) 25 MG tablet              ALLERGIES     Tramadol and Vicodin [hydrocodone-acetaminophen]    FAMILY HISTORY       Family History   Problem Relation Age of Onset    Cancer Maternal Grandfather           SOCIAL HISTORY       Social History     Socioeconomic History    Marital status:      Spouse name: Not on file    Number of children: Not on file    Years of education: Not on file    Highest education level: Not on file   Occupational History    Not on file   Tobacco Use    Smoking status: Never Smoker    Smokeless tobacco: Never Used   Substance and Sexual Activity    Alcohol use: No    Drug use: No    Sexual activity: Not on file   Other Topics Concern    Not on file   Social History Narrative    Not on file     Social Determinants of Health     Financial Resource Strain:     Difficulty of Paying Living Expenses:    Food Insecurity:     Worried About Running Out of Food in the Last Year:     [address] in the Last Year:    Transportation Needs:     Lack of Transportation (Medical):  Lack of Transportation (Non-Medical):    Physical Activity:     Days of Exercise per Week:     Minutes of Exercise per Session:    Stress:     Feeling of Stress :    Social Connections:     Frequency of Communication with Friends and Family:     Frequency of Social Gatherings with Friends and Family:     Attends [employer] Services:     Active Member of Clubs or Organizations:     Attends Club or Organization Meetings:     Marital Status:    Intimate Partner Violence:     Fear of Current or Ex-Partner:     Emotionally Abused:     Physically Abused:     Sexually Abused:        SCREENINGS    [place] Coma Scale  Eye Opening: Spontaneous  Best Verbal Response: Oriented  Best Motor Response: Obeys commands  [place] Coma Scale Score: 15           PHYSICAL EXAM    (up to 7 for level 4, 8 or more for level 5)     ED Triage Vitals [10/27/21 0553]   BP Temp Temp Source Pulse Resp SpO2 Height Weight   118/89 98.2 °F (36.8 °C) Oral 77 14 99 % 5' 11\" (1.803 m) 160 lb (72.6 kg)       Physical Exam  Vitals reviewed.    Constitutional: General: She is awake. She is in acute distress (mild). Appearance: She is well-developed, well-groomed and normal weight. She is ill-appearing. She is not toxic-appearing or diaphoretic. Interventions: She is not intubated. HENT:      Head: Normocephalic and atraumatic. Right Ear: External ear normal.      Left Ear: External ear normal.      Nose: Nose normal.      Mouth/Throat:      Mouth: Mucous membranes are dry. Pharynx: Oropharynx is clear. Eyes:      General: Lids are normal. No scleral icterus. Right eye: No discharge. Left eye: No discharge. Extraocular Movements: Extraocular movements intact. Right eye: Normal extraocular motion and no nystagmus. Left eye: Normal extraocular motion and no nystagmus. Conjunctiva/sclera: Conjunctivae normal.      Pupils: Pupils are equal.      Right eye: Pupil is round, reactive and not sluggish. Left eye: Pupil is round, reactive and not sluggish. Slit lamp exam:     Right eye: Photophobia present. Left eye: Photophobia present. Neck:      Trachea: Trachea normal. No tracheal tenderness or tracheal deviation. Comments: No nuchal rigidity   Cardiovascular:      Rate and Rhythm: Normal rate and regular rhythm. Pulses:           Radial pulses are 2+ on the right side and 2+ on the left side. Heart sounds: Normal heart sounds. Pulmonary:      Effort: Pulmonary effort is normal. No tachypnea, bradypnea, accessory muscle usage, prolonged expiration, respiratory distress or retractions. She is not intubated. Breath sounds: Normal breath sounds and air entry. No stridor, decreased air movement or transmitted upper airway sounds. No decreased breath sounds, wheezing, rhonchi or rales. Abdominal:      General: Abdomen is flat. Bowel sounds are normal. There is no distension. Palpations: Abdomen is soft. Abdomen is not rigid. Tenderness:  There is generalized abdominal tenderness (mild). There is no right CVA tenderness, left CVA tenderness, guarding or rebound. Negative signs include Grady's sign and McBurney's sign. Musculoskeletal:         General: No swelling, deformity or signs of injury. Normal range of motion. Cervical back: Full passive range of motion without pain, normal range of motion and neck supple. Tenderness (paraspinal regions) present. No swelling, edema, deformity, erythema, signs of trauma, lacerations, rigidity, spasms, torticollis, bony tenderness or crepitus. Muscular tenderness present. No pain with movement or spinous process tenderness. Normal range of motion. Thoracic back: No swelling, edema, deformity, signs of trauma, lacerations, spasms, tenderness or bony tenderness. Lumbar back: No swelling, edema, deformity, signs of trauma, lacerations, spasms, tenderness or bony tenderness. Back:       Right lower leg: No edema. Left lower leg: No edema. Comments: MSK: Normal range of motion of bilateral shoulders, elbows, wrists, hips, knees, ankles and nontender to palpation of all joints      Skin:     General: Skin is warm and dry. Coloration: Skin is not jaundiced or pale. Findings: No bruising, erythema, lesion or rash. Neurological:      General: No focal deficit present. Mental Status: She is alert and oriented to person, place, and time. Mental status is at baseline. GCS: GCS eye subscore is 4. GCS verbal subscore is 5. GCS motor subscore is 6. Cranial Nerves: No dysarthria. Sensory: Sensation is intact. No sensory deficit. Motor: Weakness (4+/5 strength with bilateral hand  strength and bilateral leg extension) present. No tremor, atrophy, abnormal muscle tone or seizure activity. Psychiatric:         Attention and Perception: Attention normal. She is attentive. Mood and Affect: Mood is anxious and depressed. Speech: Speech is delayed.          Behavior: Behavior is slowed. Behavior is cooperative.              DIAGNOSTIC RESULTS   :    Labs Reviewed   COMPREHENSIVE METABOLIC PANEL - Abnormal; Notable for the following components:       Result Value    Potassium 3.2 (*)     Glucose 109 (*)     ALT 42 (*)     All other components within normal limits    Narrative:     Performed at:  [address], [facility] [employer]   Phone [phone] - Abnormal; Notable for the following components:    Bilirubin Urine SMALL (*)     Ketones, Urine 15 (*)     All other components within normal limits    Narrative:     Performed at:  [address], [facility] [employer]   Phone [phone]   BLOOD GAS, VENOUS - Abnormal; Notable for the following components:    pH, Anatoly 7.540 (*)     pCO2, Anatoly 26.4 (*)     pO2, Anatoly 140.1 (*)     HCO3, Venous 22.1 (*)     Carboxyhemoglobin 2.0 (*)     All other components within normal limits    Narrative:     Performed at:  [place] [address] [employer]   Phone [phone]   VALPROIC ACID LEVEL, TOTAL - Abnormal; Notable for the following components:    Valproic Acid Lvl 5.3 (*)     All other components within normal limits    Narrative:     Performed at:  [address], [facility] [employer]   Phone [phone], RAPID    Narrative:     Performed at:  [place] [address] [employer]   Phone [phone]   RAPID INFLUENZA A/B ANTIGENS    Narrative:     Performed at:  [place] [address] [employer]   Phone [phone]   C DIFF TOXIN/ANTIGEN   CBC WITH AUTO DIFFERENTIAL    Narrative:     Performed at:  [address], [facility] [employer]   Phone [phone]   TROPONIN Narrative:     Performed at:  [facility]) [address]  [place], [place]   Phone [phone]   MAGNESIUM    Narrative:     Performed at:  [facility]) [address], [place]   Phone [phone]   HCG, SERUM, QUALITATIVE    Narrative:     Performed at:  [facility]) [address],  [place], [address] [place]   Phone [phone]   LACTIC ACID, PLASMA    Narrative:     Performed at:  [facility]) [address],  [place], [address]   Phone [phone]   BASIC METABOLIC PANEL W/ REFLEX TO MG FOR LOW K   CBC       All other labs were within normal range or not returned asof this dictation. EKG: All EKG's are interpreted by the Emergency Department Physician who either signs or Co-signs this chart in the absence of a cardiologist.    The Ekg interpreted by me shows  normal sinus rhythm with a rate of 80  Axis is   Left axis deviation  QTc is  normal  Intervals and Durations are unremarkable. ST Segments: no acute change and nonspecific changes  No significant change from prior EKG dated - 10/25/21  No STEMI           RADIOLOGY:   Non-plain film images such as CT, Ultrasound and MRI are read by the radiologist. Carron Geena images are visualized and preliminarily interpreted by the  ED Provider with the belowfindings:        Interpretation per the Radiologist below, if available at the time of this note:    CTA HEAD NECK W CONTRAST   Final Result   Unremarkable CTA of the head and neck. CT ABDOMEN PELVIS WO CONTRAST Additional Contrast? None   Final Result   No acute findings to the abdomen or pelvis. No findings to explain the   patient's symptoms. CT HEAD WO CONTRAST   Final Result   No acute intracranial abnormality.       Mild paranasal sinus disease         XR CHEST PORTABLE   Final Result   No evidence of acute 2130)   influenza quadrivalent split vaccine (FLUZONE;FLUARIX;FLULAVAL;AFLURIA) injection 0.5 mL (has no administration in time range)   0.9 % sodium chloride bolus (0 mLs IntraVENous Stopped 10/27/21 1152)   ondansetron (ZOFRAN) injection 4 mg (4 mg IntraVENous Given 10/27/21 0906)   morphine (PF) injection 2 mg (2 mg IntraVENous Given 10/27/21 0910)   famotidine (PEPCID) injection 20 mg (20 mg IntraVENous Given 10/27/21 0906)   acetaminophen (TYLENOL) tablet 650 mg (650 mg Oral Given 10/27/21 0911)   potassium chloride (KLOR-CON M) extended release tablet 40 mEq (40 mEq Oral Given 10/27/21 1151)   iopamidol (ISOVUE-370) 76 % injection 75 mL (75 mLs IntraVENous Given 10/27/21 0935)   LORazepam (ATIVAN) tablet 0.5 mg (0.5 mg Oral Given 10/27/21 2208)     Patient was evaluated due to reportedly having a syncopal event versus seizure this morning but having multiple events throughout the week. She has multiple complaints including severe headache, chest pain, shortness of breath, abdominal pain. Based on her multitude of symptoms and being back for the third time within the last week, I did ultimately order a CT of the head and cervical spine which were negative for any intracranial hemorrhage or cervical spine fracture per radiologist.  This was obtained due to concern for potentially hitting her head. She did complain of chest discomfort and therefore x-ray was obtained showing no signs of pneumonia and story at this time not suggestive of pulmonary embolism with reassuring vitals upon arrival and [facility] = 0. Troponin was negative and story not suggestive of acute coronary syndrome. Lab work was overall reassuring except for mild hypokalemia which was replenished. Upon arrival, I did discuss with the patient that with her being here for the third time in the last week, I did recommend admission and she did agree with this plan.   She will need further assessment in the hospital to determine if the cause of her

## 2021-10-27 NOTE — ED NOTES
Pt came in today stating that she has had several episodes at home with syncope and seizure activity. Per EMS pt with no post ictal on arrival pt awake alert resp easy. Pt also state that she was having nausea and vomiting and diarrhea more than 10 times at home. Pt has NOT had any vomiting nor diarrhea since this ER visit. Pt state that she is having severe abd pain pt offered Mag to help with stool and pt refused. Pt is awake alert. Pt walk with bathroom with steady gait No increase in pain no SOB pt back on monitor.  Pt orient x4     Charlene Kramer, SHELBI  10/27/21 2909

## 2021-10-27 NOTE — ED NOTES
Bed: 11  Expected date:   Expected time:   Means of arrival:   Comments:  [address] [facility], RN  10/27/21 4119

## 2021-10-28 LAB
ANION GAP SERPL CALCULATED.3IONS-SCNC: 13 MMOL/L (ref 3–16)
BUN BLDV-MCNC: 10 MG/DL (ref 7–20)
CALCIUM SERPL-MCNC: 9.5 MG/DL (ref 8.3–10.6)
CHLORIDE BLD-SCNC: 105 MMOL/L (ref 99–110)
CO2: 21 MMOL/L (ref 21–32)
CREAT SERPL-MCNC: <0.5 MG/DL (ref 0.6–1.1)
EKG ATRIAL RATE: 80 BPM
EKG DIAGNOSIS: NORMAL
EKG P AXIS: 28 DEGREES
EKG P-R INTERVAL: 156 MS
EKG Q-T INTERVAL: 386 MS
EKG QRS DURATION: 84 MS
EKG QTC CALCULATION (BAZETT): 445 MS
EKG R AXIS: -46 DEGREES
EKG T AXIS: 61 DEGREES
EKG VENTRICULAR RATE: 80 BPM
GFR AFRICAN AMERICAN: >60
GFR NON-AFRICAN AMERICAN: >60
GLUCOSE BLD-MCNC: 95 MG/DL (ref 70–99)
HCT VFR BLD CALC: 39.9 % (ref 36–48)
HEMOGLOBIN: 13.8 G/DL (ref 12–16)
LV EF: 58 %
LVEF MODALITY: NORMAL
MCH RBC QN AUTO: 30.5 PG (ref 26–34)
MCHC RBC AUTO-ENTMCNC: 34.7 G/DL (ref 31–36)
MCV RBC AUTO: 88 FL (ref 80–100)
PDW BLD-RTO: 12.6 % (ref 12.4–15.4)
PLATELET # BLD: 237 K/UL (ref 135–450)
PMV BLD AUTO: 7.8 FL (ref 5–10.5)
POTASSIUM REFLEX MAGNESIUM: 3.8 MMOL/L (ref 3.5–5.1)
RBC # BLD: 4.54 M/UL (ref 4–5.2)
SODIUM BLD-SCNC: 139 MMOL/L (ref 136–145)
TOTAL CK: 23 U/L (ref 26–192)
WBC # BLD: 5.5 K/UL (ref 4–11)

## 2021-10-28 PROCEDURE — G0378 HOSPITAL OBSERVATION PER HR: HCPCS

## 2021-10-28 PROCEDURE — 95816 EEG AWAKE AND DROWSY: CPT | Performed by: PSYCHIATRY & NEUROLOGY

## 2021-10-28 PROCEDURE — 6360000002 HC RX W HCPCS: Performed by: INTERNAL MEDICINE

## 2021-10-28 PROCEDURE — 36415 COLL VENOUS BLD VENIPUNCTURE: CPT

## 2021-10-28 PROCEDURE — 97535 SELF CARE MNGMENT TRAINING: CPT

## 2021-10-28 PROCEDURE — 93010 ELECTROCARDIOGRAM REPORT: CPT | Performed by: INTERNAL MEDICINE

## 2021-10-28 PROCEDURE — 97530 THERAPEUTIC ACTIVITIES: CPT

## 2021-10-28 PROCEDURE — 93306 TTE W/DOPPLER COMPLETE: CPT

## 2021-10-28 PROCEDURE — 2580000003 HC RX 258: Performed by: INTERNAL MEDICINE

## 2021-10-28 PROCEDURE — 97166 OT EVAL MOD COMPLEX 45 MIN: CPT

## 2021-10-28 PROCEDURE — 80048 BASIC METABOLIC PNL TOTAL CA: CPT

## 2021-10-28 PROCEDURE — 6370000000 HC RX 637 (ALT 250 FOR IP): Performed by: INTERNAL MEDICINE

## 2021-10-28 PROCEDURE — 95816 EEG AWAKE AND DROWSY: CPT

## 2021-10-28 PROCEDURE — 82550 ASSAY OF CK (CPK): CPT

## 2021-10-28 PROCEDURE — 85027 COMPLETE CBC AUTOMATED: CPT

## 2021-10-28 PROCEDURE — 97162 PT EVAL MOD COMPLEX 30 MIN: CPT

## 2021-10-28 PROCEDURE — 97116 GAIT TRAINING THERAPY: CPT

## 2021-10-28 PROCEDURE — 96372 THER/PROPH/DIAG INJ SC/IM: CPT

## 2021-10-28 PROCEDURE — 99220 PR INITIAL OBSERVATION CARE/DAY 70 MINUTES: CPT | Performed by: PSYCHIATRY & NEUROLOGY

## 2021-10-28 RX ORDER — LANOLIN ALCOHOL/MO/W.PET/CERES
3 CREAM (GRAM) TOPICAL NIGHTLY PRN
Status: DISCONTINUED | OUTPATIENT
Start: 2021-10-28 | End: 2021-10-30 | Stop reason: HOSPADM

## 2021-10-28 RX ADMIN — HYDROCODONE BITARTRATE AND ACETAMINOPHEN 1 TABLET: 5; 325 TABLET ORAL at 22:06

## 2021-10-28 RX ADMIN — ONDANSETRON 4 MG: 4 TABLET, ORALLY DISINTEGRATING ORAL at 16:43

## 2021-10-28 RX ADMIN — HYDROCODONE BITARTRATE AND ACETAMINOPHEN 1 TABLET: 5; 325 TABLET ORAL at 09:56

## 2021-10-28 RX ADMIN — ACETAMINOPHEN 650 MG: 325 TABLET ORAL at 16:43

## 2021-10-28 RX ADMIN — DIVALPROEX SODIUM 250 MG: 250 TABLET, DELAYED RELEASE ORAL at 09:56

## 2021-10-28 RX ADMIN — ONDANSETRON 4 MG: 4 TABLET, ORALLY DISINTEGRATING ORAL at 09:56

## 2021-10-28 RX ADMIN — Medication 10 ML: at 09:56

## 2021-10-28 RX ADMIN — SODIUM CHLORIDE: 9 INJECTION, SOLUTION INTRAVENOUS at 09:59

## 2021-10-28 RX ADMIN — ENOXAPARIN SODIUM 40 MG: 40 INJECTION SUBCUTANEOUS at 09:56

## 2021-10-28 RX ADMIN — Medication 3 MG: at 22:06

## 2021-10-28 RX ADMIN — DIVALPROEX SODIUM 250 MG: 250 TABLET, DELAYED RELEASE ORAL at 22:06

## 2021-10-28 ASSESSMENT — ENCOUNTER SYMPTOMS
VOMITING: 1
BACK PAIN: 1
NAUSEA: 1
SHORTNESS OF BREATH: 1
ABDOMINAL PAIN: 1
DIARRHEA: 0
COUGH: 1
CHEST TIGHTNESS: 1
SORE THROAT: 1
COLOR CHANGE: 0

## 2021-10-28 ASSESSMENT — PAIN DESCRIPTION - PAIN TYPE
TYPE: ACUTE PAIN

## 2021-10-28 ASSESSMENT — PAIN DESCRIPTION - LOCATION
LOCATION: HEAD

## 2021-10-28 ASSESSMENT — PAIN SCALES - GENERAL
PAINLEVEL_OUTOF10: 0
PAINLEVEL_OUTOF10: 0
PAINLEVEL_OUTOF10: 9
PAINLEVEL_OUTOF10: 0
PAINLEVEL_OUTOF10: 8
PAINLEVEL_OUTOF10: 9
PAINLEVEL_OUTOF10: 8
PAINLEVEL_OUTOF10: 8

## 2021-10-28 ASSESSMENT — PAIN DESCRIPTION - ORIENTATION
ORIENTATION: LOWER
ORIENTATION: LOWER

## 2021-10-28 ASSESSMENT — PAIN DESCRIPTION - ONSET
ONSET: ON-GOING
ONSET: ON-GOING

## 2021-10-28 ASSESSMENT — PAIN - FUNCTIONAL ASSESSMENT: PAIN_FUNCTIONAL_ASSESSMENT: PREVENTS OR INTERFERES SOME ACTIVE ACTIVITIES AND ADLS

## 2021-10-28 ASSESSMENT — PAIN DESCRIPTION - FREQUENCY
FREQUENCY: CONTINUOUS
FREQUENCY: CONTINUOUS

## 2021-10-28 ASSESSMENT — PAIN DESCRIPTION - DESCRIPTORS
DESCRIPTORS: HEADACHE

## 2021-10-28 NOTE — PROCEDURES
Patient: Marycarmen Dominique    MR Number: [id number]  YOB: 1975  Date of Visit: 10/28/2021    Clinical History:  The patient is a 54y.o. years old female with new onset seizure      Method: The EEG was performed utilizing the international 10/20 of electrode placements of both referential and bipolar montages. The patient was awake and drowsy through out the recording. Photic stimulation was performed. Findings: The background of the EEG showed normal alpha posterior background of 9-10 HZ and amplitude of 20-40 UV. This background was symmetric, waxing and waning, and reactive with eye opening and closure. As the patient became drowsy, generalized diffuse slowing was seen through recording at 6-7 HZ. This generalized slowing was symmetric, non rhythmical, and continuous. No spike or sharp waves were seen. Photic stimulation did not activate EEG. Impression: This EEG  is within normal limits. There is no evidence of epileptiform discharges, focal, or lateralizing abnormalities.       Thuy Willams MD      Board certified in clinical neurophysiology

## 2021-10-28 NOTE — PROGRESS NOTES
Occupational Therapy   Occupational Therapy Initial Assessment and Treatment Note   Date: 10/28/2021   Patient Name: Arielle Hood  MRN: [id number]     : 1975    Date of Service: 10/28/2021    Discharge Recommendations:  24 hour supervision or assist, Home with Home health OT     Assessment   Performance deficits / Impairments: Decreased functional mobility ; Decreased ADL status; Decreased balance  Assessment: OT eval completed. Pt admitted for sycope and collapse. She reported nausea, vomiting and headache--RN notified. During OT session, pt was CGA for standing balance and transfers. She can reach to LE for ADLs but CGA provided when standing for safety. She states that she feels unsteady and is concerned about falling at home. Recommend 24/ support for ADLs for safety and HHOT. Cont OT in acute care. Prognosis: Good  Decision Making: Medium Complexity  OT Education: OT Role;Plan of Care;Transfer Training;ADL Adaptive Strategies  Disease Specific Education: Pt educated on importance of OOB mobility, prevention of complications of bedrest, and general safety during hospitalization. Pt verbalized understanding  REQUIRES OT FOLLOW UP: Yes  Activity Tolerance  Activity Tolerance: Treatment limited secondary to medical complications (free text); Patient limited by pain  Activity Tolerance: c/o nausea and headache--RN notified  Safety Devices  Safety Devices in place: Yes  Type of devices: Nurse notified;Gait belt;Bed alarm in place;Call light within reach; Left in bed         Patient Diagnosis(es): The primary encounter diagnosis was Syncope and collapse. A diagnosis of Hypokalemia was also pertinent to this visit. has a past medical history of Anxiety, Back pain, DDD (degenerative disc disease), Depression, Headache(784.0), Herniated disc, and New onset seizure ([facility] [address] has a past surgical history that includes Anus surgery; Humerus fracture surgery, implant; and  section. Restrictions  Restrictions/Precautions  Restrictions/Precautions: Contact Precautions, Fall Risk, Seizure, Up as Tolerated  Position Activity Restriction  Other position/activity restrictions: Avasys    Subjective   General  Chart Reviewed: Yes  Patient assessed for rehabilitation services?: Yes  Family / Caregiver Present: No  Referring Practitioner: ABBI López  Diagnosis: syncope and collapse  Subjective  Subjective: Pt c/o headache and nausea--RN notified  General Comment  Comments: RN approved therapy  Pain Assessment  Pain Level: 8  Response to Pain Intervention: Patient Satisfied  Vital Signs  Level of Consciousness: Alert (0)  Social/Functional History  Social/Functional History  Lives With: Son (15 yo son)  Type of Home: Apartment  Home Layout: One level  Home Access: Stairs to enter with rails  Entrance Stairs - Number of Steps: 6 steps to enter and 6 steps down to apartment  Entrance Stairs - Rails: Both  Bathroom Shower/Tub: Tub/Shower unit  Bathroom Toilet: Standard  Home Equipment:  (no DME)  ADL Assistance: Independent  Homemaking Assistance: Independent  Ambulation Assistance: Independent  Transfer Assistance: Independent  Active : Yes  Occupation: Full time employment  Type of occupation:  (but has not been able to work recently)  Additional Comments: Pt reports passing out numerous times for over a week. Objective   Vision: Within Functional Limits  Hearing: Within functional limits (\"ringing in her ears\")    Orientation  Overall Orientation Status: Within Functional Limits     Balance  Sitting Balance: Supervision  Standing Balance: Contact guard assistance (with effort)  Standing Balance  Activity: Bed <>Chair transfer with CGA and increased time. Pt reported headache and nausea with movement. Returned to bed to rest.  ADL  Grooming: Stand by assistance;Verbal cueing; Increased time to complete (seated)  UE Dressing: Stand by assistance; Increased time to complete  LE Dressing: Contact guard assistance; Increased time to complete (CGA for balance.)  Additional Comments: Slow motor skills during ADL activity  Tone RUE  RUE Tone: Normotonic  Tone LUE  LUE Tone: Normotonic  Coordination  Movements Are Fluid And Coordinated: No  Coordination and Movement description: Decreased speed;Decreased accuracy; Right UE;Left UE  Quality of Movement Other  Comment: eye hand coordination is delayed with finger to nose testing, no pronator drift     Bed mobility  Supine to Sit: Stand by assistance (HOB elevated, increased time to complete)  Sit to Supine: Supervision  Transfers  Stand Pivot Transfers: Contact guard assistance  Sit to stand: Contact guard assistance  Stand to sit: Contact guard assistance     Cognition  Overall Cognitive Status: Exceptions  Following Commands:  Follows one step commands with increased time  Attention Span: Attends with cues to redirect        Sensation  Overall Sensation Status:  (reports numbness in B LE and tingling in B hands)      LUE AROM (degrees)  LUE AROM : WFL  RUE AROM (degrees)  RUE AROM : WFL  LUE Strength  LUE Strength Comment: 3+/5  RUE Strength  RUE Strength Comment: 3+/5         Plan   Plan  Times per week: 3-5x  Current Treatment Recommendations: Self-Care / ADL, Equipment Evaluation, Education, & procurement, Balance Training, Functional Mobility Training, Safety Education & Training    AM-PAC Score   AM-[facility] Inpatient Daily Activity Raw Score: 20 (10/28/21 1129)  AM-PAC Inpatient ADL T-Scale Score : 42.03 (10/28/21 1129)  ADL Inpatient CMS 0-100% Score: 38.32 (10/28/21 1129)  ADL Inpatient CMS G-Code Modifier : Ottie Gain (10/28/21 1129)    Goals  Short term goals  Time Frame for Short term goals: 1 week (11/04) unless noted  Short term goal 1: Perform functional transfers with supervision  Short term goal 2: Perform bathroom mobility with supervision  Short term goal 3: Perform LE ADL tasks (ie dressing/toileting) with supervision  Short term goal 4: Stand at sink for 1-2 ADL tasks with supervision by 11/1  Patient Goals   Patient goals : Be able to walk     Therapy Time   Individual Concurrent Group Co-treatment   Time In 0750         Time Out 0828         Minutes 38         Timed Code Treatment Minutes: 28 Minutes (10 min eval)    If pt is discharged prior to next OT session, this note will serve as the discharge summary.   Balbir Nino OT Report given to Ursula CHANG. Patient in stable condition.

## 2021-10-28 NOTE — CARE COORDINATION
CASE MANAGEMENT INITIAL ASSESSMENT      Reviewed chart and completed assessment via telephone with:Pt at bedside  Explained Case Management role/services. yes    Primary contact information:    Health Care Decision Maker :   Primary Decision Maker: Krista Jameson - Child - [phone]    Secondary Decision Maker: Rico Elise - Child - [phone]          Can this person be reached and be able to respond quickly, such as within a few minutes or hours? Yes  Who would be your back-up decision maker? Name   Phone Number:    Admit date/status:10/27 Obs  Diagnosis:Sz  Is this a Readmission?:  No      Insurance:[facility]   Precert required for SNF: Yes       3 night stay required: No    Living arrangements, Adls, care needs, prior to admission:Lives with 15 YO son in an apartment w MI. Not currently driving due to [facility]. Adult children are caring for her younger son.   No DME or services    Transportation:family    Durable Medical Equipment at home:  Walker__Cane__RTS__ BSC__Shower Chair__  02__ HHN__ CPAP__  BiPap__  Hospital Bed__ W/C___ Other___none_______    Services in the home and/or outpatient, prior to admission:none    ·     PT/OT recs:NA    Hospital Exemption Notification (HEN):NA    Barriers to discharge:Unclear at present    Plan/comments:Plans home- CM is following for poss needs    [facility] on chart for MD beronica Toro RN

## 2021-10-28 NOTE — PROGRESS NOTES
Patient completed MRI checklist with RN assistance. MRI checklist faxed to the MRI department. Will monitor.

## 2021-10-28 NOTE — PROGRESS NOTES
Patient complaining of nausea and pain 7 out of 10 pain. PRN medications administered per orders, see MAR. Will monitor patient closely. VSS. Assessment completed as charted. Bed in lowest locked position with call light in reach.

## 2021-10-28 NOTE — CONSULTS
Patient home medication list reviewed by pharmacist.  - will update /confirm later today   Flor Vigil/Natividad. 10/28/21 9:19 AM EDT

## 2021-10-28 NOTE — PROGRESS NOTES
Patient's orthostatics:     BP lyin/78  Pulse lyin     BP sittin/88  Pulse sittin     BP standin/96  Pulse standin     Pt feeling a bit shaky and weak upon standing. Will monitor.

## 2021-10-28 NOTE — CONSULTS
In patient Neurology consult        [facility] Neurology      MD Too Villeda  1975    Date of Service: 10/28/2021    Referring Physician: Angelica Nair MD      Reason for the consult and CC: Syncope with possible seizure    HPI:   The patient is a 54 y.o. female, with a PMH of seizure, anxiety, depression, and opioid abuse, who presented to [facility] 3 times in one week for syncope and possible seizures. The patient is very tearful during the exam. She tells me that she keeps waking up from sleep and not knowing where she is. On the day of admission she woke up and said her head was in a pool of water. She also complains of a severe headache with associated photophobia and phonophobia. She tells me she has been having grand mal seizures at home, although these are unwitnessed. She states she knows she is having them because she wakes up and she has bitten her tongue. She has never taken seizure medication routinely. She denies fever, chills, dizziness, dysphagia, dysarthria, focal weakness. She endorses generalized weakness, nausea, vomiting, and diarrhea. She tells me everything she has been feeling this past week is new and out of the ordinary.        Family History   Problem Relation Age of Onset    Cancer Maternal Grandfather      Past Surgical History:   Procedure Laterality Date    ANUS SURGERY      hemerrhoid surgery x 5     SECTION      x2    HUMERUS FRACTURE SURGERY WITH IMPLANT          Past Medical History:   Diagnosis Date    Anxiety     Back pain     DDD (degenerative disc disease)     Depression     Headache(784.0) 3/25/2011    Herniated disc     lumbar    New onset seizure ([facility] [address] 2018     Social History     Tobacco Use    Smoking status: Never Smoker    Smokeless tobacco: Never Used   Substance Use Topics    Alcohol use: No    Drug use: No     Allergies   Allergen Reactions    Tramadol     Vicodin [Hydrocodone-Acetaminophen] Nausea And Vomiting Current Facility-Administered Medications   Medication Dose Route Frequency Provider Last Rate Last Admin    influenza quadrivalent split vaccine (FLUZONE;FLUARIX;FLULAVAL;AFLURIA) injection 0.5 mL  0.5 mL IntraMUSCular Prior to discharge MARITZA Sena - CNP        sodium chloride flush 0.9 % injection 10 mL  10 mL IntraVENous 2 times per day Edelmira Gavin MD   10 mL at 10/27/21 2131    sodium chloride flush 0.9 % injection 10 mL  10 mL IntraVENous PRN Edelmira Gavin MD        0.9 % sodium chloride infusion  25 mL IntraVENous PRN Edelmira Gavin MD        potassium chloride 10 mEq/100 mL IVPB (Peripheral Line)  10 mEq IntraVENous PRN Edelmira Gavin MD        magnesium sulfate 2000 mg in 50 mL IVPB premix  2,000 mg IntraVENous PRN Edelmira Gavin MD        enoxaparin (LOVENOX) injection 40 mg  40 mg SubCUTAneous Daily Edelmira Gavin MD        ondansetron (ZOFRAN-ODT) disintegrating tablet 4 mg  4 mg Oral Q8H PRN Edelmira Gavin MD        Or    ondansetron (ZOFRAN) injection 4 mg  4 mg IntraVENous Q6H PRN Edelmira Gavin MD   4 mg at 10/27/21 1511    magnesium hydroxide (MILK OF MAGNESIA) 400 MG/5ML suspension 30 mL  30 mL Oral Daily PRN Edelmira Gavin MD        acetaminophen (TYLENOL) tablet 650 mg  650 mg Oral Q6H PRN Edelmira Gavin MD        Or    acetaminophen (TYLENOL) suppository 650 mg  650 mg Rectal Q6H PRN Edelmira Gavin MD        perflutren lipid microspheres (DEFINITY) injection 1.65 mg  1.5 mL IntraVENous ONCE PRN Edelmira Gavin MD        divalproex (DEPAKOTE) DR tablet 250 mg  250 mg Oral BID Edelmira Gavin MD   250 mg at 10/27/21 2208    HYDROcodone-acetaminophen (NORCO) 5-325 MG per tablet 1 tablet  1 tablet Oral Q6H PRN Edelmira Gavin MD   1 tablet at 10/27/21 1512    0.9 % sodium chloride infusion   IntraVENous Continuous Edelmira Gavin MD 75 mL/hr at 10/27/21 2130 New Bag at 10/27/21 2130       ROS : A 10-14 system review of constitutional, cardiovascular, respiratory, eyes, musculoskeletal, endocrine, GI, ENT, skin, hematological, genitourinary, psychiatric and neurologic systems was obtained and updated today and is unremarkable except as mentioned in my HPI      Exam:     Constitutional:   Vitals:    10/28/21 0100 10/28/21 0112 10/28/21 0500 10/28/21 0807   BP: (!) 148/116 (!) 132/98 125/85 131/83   Pulse: 108  78 74   Resp: 18  18 16   Temp: 97.8 °F (36.6 °C)  98.1 °F (36.7 °C) 98 °F (36.7 °C)   TempSrc: Oral  Oral Oral   SpO2: 100%  99% 99%   Weight:       Height:           General appearance and observation: Normal development. Lying in a dark room with a washcloth on her head. Eye:  Fundus: No blurring of optic disc. Neck: supple  Cardiovascular: No lower leg edema with good pulsation. Mental Status:   Oriented to person, place, problem, and time. Memory: Good immediate recall. Intact remote memory  Normal attention span and concentration. Language: intact naming, repeating and fluency   Good fund of Knowledge. Aware of current events and vocabulary   Cranial Nerves:   II: Visual fields: Full. Pupils: equal, round, reactive to light  III,IV,VI: Extra Ocular Movements are intact. No nystagmus  V: Facial sensation is intact  VII: Facial strength and movements: intact and symmetric  VIII: Hearing: Intact  IX: Palate elevation is symmetric  XI: Shoulder shrug is intact  XII: Tongue movements are normal  Musculoskeletal: Moves all extremities but with very poor effort. Tone: Normal tone. Reflexes: Symmetric 2+ in the arms and 2+ in the legs   Planters: flexor bilaterally. Coordination: no pronator drift. Exam is inconsistent  Sensation: normal to all modalities in both arms and legs.   Gait/Posture: Did not test gait    Data:  LABS:   Lab Results   Component Value Date     10/28/2021    K 3.8 10/28/2021     10/28/2021    CO2 21 10/28/2021    BUN 10 10/28/2021    CREATININE <0.5 10/28/2021    GFRAA >60 10/28/2021    GFRAA >60 12/02/2012    LABGLOM >60 10/28/2021    GLUCOSE 95 10/28/2021    MG 2.10 10/27/2021    CALCIUM 9.5 10/28/2021     Lab Results   Component Value Date    WBC 5.5 10/28/2021    RBC 4.54 10/28/2021    HGB 13.8 10/28/2021    HCT 39.9 10/28/2021    MCV 88.0 10/28/2021    RDW 12.6 10/28/2021     10/28/2021   No results found for: INR, PROTIME    Neuroimaging was independently reviewed by myself and discussed results with the patient and/or family  Reviewed notes from different physicians  Reviewed lab and blood testing    Impression:     Syncope vs seizure - CTH and CTA head/neck negative. Events have not been witnessed. Patient was not post-ictal according to EMS who arrived on the scene. Low suspicion for CNS infection. Exam is inconsistent. Headache, possible initial episode of migraine. Anxiety and depression  Hx of narcotic abuse    Recommendation:     EEG and MRI of brain ordered. ECHO  Supportive care for migraine. Avoid narcotics. Can continue Depakote for now. Monitor on tele. Follow electrolytes. Thank you for referring such patient. If you have any questions regarding my consult note, please don't hesitate to call me. Jessica Bolanos, CNP    This dictation was generated by voice recognition computer software.  Although all attempts are made to edit the dictation for accuracy, there may be errors in the  transcription that are not intended

## 2021-10-28 NOTE — PLAN OF CARE
Problem: Pain:  Goal: Control of acute pain  Description: Control of acute pain  Outcome: Ongoing     Problem: Pain:  Goal: Pain level will decrease  Description: Pain level will decrease  10/28/2021 1213 by Mk Donato RN  Outcome: Ongoing     Problem: Falls - Risk of:  Goal: Will remain free from falls  Description: Will remain free from falls  10/28/2021 0542 by Gerardo Hernandez RN  Outcome: Ongoing     Problem: Falls - Risk of:  Goal: Absence of physical injury  Description: Absence of physical injury  Outcome: Ongoing

## 2021-10-28 NOTE — PROGRESS NOTES
For patient safety, an AVASYS camera has been placed in patient's room. AVASYS monitoring needed for seizure precautions. Writer placed call to monitor observer to verify camera and review patient name, reason for monitoring, and contact information for primary RN. Patient notified of use of remote monitoring upon implementation of camera and verbalized understanding.

## 2021-10-28 NOTE — PROGRESS NOTES
For patient safety, an AVASYS camera has been placed in patient's room. AVASYS monitoring needed for Increased Fall Risk. Writer placed call to monitor observer to verify camera number 1 and review patient name, reason for monitoring, and contact information for primary RN. Patient notified of use of remote monitoring upon implementation of camera and verbalized understanding.

## 2021-10-28 NOTE — PROGRESS NOTES
Physical Therapy    Facility/Department: [facility] B3 - MED SURG  Initial Assessment    NAME: Wyatt Freire  : 1975  MRN: [id number]    Date of Service: 10/28/2021    Discharge Recommendations:  24 hour supervision or assist, Home with Home health PT   PT Equipment Recommendations  Other: May need RW. CTA  If pt is unable to be seen after this session, please let this note serve as discharge summary. Please see case management note for discharge disposition. Thank you. Assessment   Body structures, Functions, Activity limitations: Decreased functional mobility ; Decreased balance;Decreased strength;Decreased endurance;Decreased coordination;Decreased vision/visual deficit; Decreased ADL status; Increased pain  Assessment: Pt functioning below baseline after being admitted for syncope and possible seizures. Pt reports she was previously indep and now having difficulty wth mobility and described forgetting how she got from one place to another (doesn't remember driving, doesn't remember walking to her son's room or laying down). Today, pt demosntrated decreased strength when completing MMT, however was able to stand from low toilet level without assist and ambulate short distance in room. Pt used a RW for added support and confidence when moving. Pt declined sitting in the chair out of fear of falling out stating that happened at home. Anticipate improved mobility by DC and pt will be safe to be DC home with assist. May need RW for safety. Will CTA. Treatment Diagnosis: decreased mobility  Prognosis: Good  Decision Making: Medium Complexity  PT Education: Goals; General Safety;Gait Training;Disease Specific Education;PT Role;Plan of Care; Functional Mobility Training  Patient Education: Pt expressed understanding on role of PT to assess mobility  Barriers to Learning: cognition, distraction  REQUIRES PT FOLLOW UP: Yes  Activity Tolerance  Activity Tolerance: Patient limited by fatigue;Patient limited by endurance; Patient limited by pain  Activity Tolerance: 100% SpO2 on Ra, 74 HR, 129/89       Patient Diagnosis(es): The primary encounter diagnosis was Syncope and collapse. A diagnosis of Hypokalemia was also pertinent to this visit. has a past medical history of Anxiety, Back pain, DDD (degenerative disc disease), Depression, Headache(784.0), Herniated disc, and New onset seizure ([address] has a past surgical history that includes Anus surgery; Humerus fracture surgery, implant; and  section. Restrictions  Restrictions/Precautions  Restrictions/Precautions: Contact Precautions, Fall Risk, Seizure, Up as Tolerated  Position Activity Restriction  Other position/activity restrictions: Avasys  Vision/Hearing  Vision:  (seeing white lights)     Subjective  General  Chart Reviewed: Yes  Patient assessed for rehabilitation services?: Yes  Response To Previous Treatment: Not applicable  Family / Caregiver Present: No  Referring Practitioner: Jessica Perea MD  Referral Date : 10/27/21  Diagnosis: syncope  Follows Commands: Within Functional Limits  General Comment  Comments: cleared by nursing  Subjective  Subjective: pt awake in bed. 9/10 headache.  RN getting pain medication  Pain Screening  Patient Currently in Pain: Yes          Orientation  Orientation  Overall Orientation Status: Within Normal Limits  Social/Functional History  Social/Functional History  Lives With: Son (15 yo son)  Type of Home: Apartment  Home Layout: One level  Home Access: Stairs to enter with rails  Entrance Stairs - Number of Steps: 6 steps to enter and 6 steps down to apartment  Entrance Stairs - Rails: Both  Bathroom Shower/Tub: Tub/Shower unit  Bathroom Toilet: Standard  Home Equipment:  (no DME)  ADL Assistance: Independent  Homemaking Assistance: Independent  Ambulation Assistance: Independent  Transfer Assistance: Independent  Active : Yes  Occupation: Full time employment  Type of occupation:  (but has not been able to work recently)  Additional Comments: Pt reports passing out numerous times for over a week. Cognition        Objective          PROM RLE (degrees)  RLE PROM: WFL  PROM LLE (degrees)  LLE PROM: WFL  Strength RLE  Strength RLE: WFL  Comment: difficult to formally assess as pt became tremerous when asked to demonstrate AROM and only able to lift through partial ROM, however pt was able to stand without assist demonstrated at least 3/5 functional strength  Strength LLE  Strength LLE: WFL  Comment: difficult to formally assess as pt became tremerous when asked to demonstrate AROM and only able to lift through partial ROM, however pt was able to stand without assist demonstrated at least 3/5 functional strength  Coordination  Rapid Alternating Movements: Dysdiadokinesia  Heel to Shin: Ataxic  Sensation  Overall Sensation Status:  (reports numbness in B LE and tingling in B hands)  Bed mobility  Supine to Sit: Stand by assistance (additional time and effort)  Sit to Supine: Modified independent  Transfers  Sit to Stand: Supervision  Stand to sit: Supervision  Ambulation  Ambulation?: Yes  More Ambulation?: No  Ambulation 1  Surface: level tile  Device: Rolling Walker  Assistance: Contact guard assistance;Stand by assistance  Gait Deviations: Slow Ayleen; Shuffles  Distance: 8' x 2  Stairs/Curb  Stairs?: No     Balance  Posture: Good  Sitting - Static: Good  Sitting - Dynamic: Good  Standing - Static: Fair  Standing - Dynamic: [address]  Times per week: 3-5x/week  Times per day: Daily  Current Treatment Recommendations: Strengthening, Balance Training, Endurance Training, Functional Mobility Training, Transfer Training, Gait Training, Stair training, Positioning, Equipment Evaluation, Education, & procurement, Neuromuscular Re-education, Home Exercise Program, Safety Education & Training, Patient/Caregiver Education & Training  Safety Devices  Type of devices:  All fall risk precautions in place, Bed alarm in place, Telesitter in use, Call light within reach, Gait belt, Patient at risk for falls, Left in bed, Nurse notified  Restraints  Initially in place: No      Goals  Short term goals  Time Frame for Short term goals: 11/4  Short term goal 1: Pt will complete transfers with Mod I  Short term goal 2: Pt will ambulate x 76' with LRAD with supervision  Short term goal 3: Pt will complete 6 steps with SBA  Short term goal 4: Pt will complete B LE exercises to improve functional mobility by 10/30  Patient Goals   Patient goals : to be indep, to walk       Therapy Time   Individual Concurrent Group Co-treatment   Time In 0905         Time Out 0945         Minutes 40         Timed Code Treatment Minutes: [address], PT

## 2021-10-28 NOTE — PROGRESS NOTES
Patient complaining of nausea and 9 out of 10 pain. PRN medications administered per orders, see MAR. Will monitor patient closely. VSS. Assessment completed as charted. Bed in lowest locked position with call light in reach.

## 2021-10-29 ENCOUNTER — TELEPHONE (OUTPATIENT)
Dept: INTERNAL MEDICINE CLINIC | Age: 46
End: 2021-10-29

## 2021-10-29 ENCOUNTER — APPOINTMENT (OUTPATIENT)
Dept: MRI IMAGING | Age: 46
End: 2021-10-29
Payer: MEDICAID

## 2021-10-29 LAB
ANION GAP SERPL CALCULATED.3IONS-SCNC: 11 MMOL/L (ref 3–16)
BUN BLDV-MCNC: 6 MG/DL (ref 7–20)
CALCIUM SERPL-MCNC: 9 MG/DL (ref 8.3–10.6)
CHLORIDE BLD-SCNC: 106 MMOL/L (ref 99–110)
CO2: 23 MMOL/L (ref 21–32)
CREAT SERPL-MCNC: <0.5 MG/DL (ref 0.6–1.1)
GFR AFRICAN AMERICAN: >60
GFR NON-AFRICAN AMERICAN: >60
GLUCOSE BLD-MCNC: 96 MG/DL (ref 70–99)
HCT VFR BLD CALC: 37.2 % (ref 36–48)
HEMOGLOBIN: 12.9 G/DL (ref 12–16)
MCH RBC QN AUTO: 30.6 PG (ref 26–34)
MCHC RBC AUTO-ENTMCNC: 34.7 G/DL (ref 31–36)
MCV RBC AUTO: 88.1 FL (ref 80–100)
PDW BLD-RTO: 12.6 % (ref 12.4–15.4)
PLATELET # BLD: 222 K/UL (ref 135–450)
PMV BLD AUTO: 7.7 FL (ref 5–10.5)
POTASSIUM REFLEX MAGNESIUM: 3.7 MMOL/L (ref 3.5–5.1)
RBC # BLD: 4.22 M/UL (ref 4–5.2)
SODIUM BLD-SCNC: 140 MMOL/L (ref 136–145)
WBC # BLD: 4.4 K/UL (ref 4–11)

## 2021-10-29 PROCEDURE — 2580000003 HC RX 258: Performed by: INTERNAL MEDICINE

## 2021-10-29 PROCEDURE — A9579 GAD-BASE MR CONTRAST NOS,1ML: HCPCS | Performed by: NURSE PRACTITIONER

## 2021-10-29 PROCEDURE — 6370000000 HC RX 637 (ALT 250 FOR IP): Performed by: INTERNAL MEDICINE

## 2021-10-29 PROCEDURE — 6360000004 HC RX CONTRAST MEDICATION: Performed by: NURSE PRACTITIONER

## 2021-10-29 PROCEDURE — G0378 HOSPITAL OBSERVATION PER HR: HCPCS

## 2021-10-29 PROCEDURE — 6360000002 HC RX W HCPCS: Performed by: INTERNAL MEDICINE

## 2021-10-29 PROCEDURE — 85027 COMPLETE CBC AUTOMATED: CPT

## 2021-10-29 PROCEDURE — 70553 MRI BRAIN STEM W/O & W/DYE: CPT

## 2021-10-29 PROCEDURE — 99226 PR SBSQ OBSERVATION CARE/DAY 35 MINUTES: CPT | Performed by: NURSE PRACTITIONER

## 2021-10-29 PROCEDURE — 96372 THER/PROPH/DIAG INJ SC/IM: CPT

## 2021-10-29 PROCEDURE — 96376 TX/PRO/DX INJ SAME DRUG ADON: CPT

## 2021-10-29 PROCEDURE — 97110 THERAPEUTIC EXERCISES: CPT

## 2021-10-29 PROCEDURE — 36415 COLL VENOUS BLD VENIPUNCTURE: CPT

## 2021-10-29 PROCEDURE — 97116 GAIT TRAINING THERAPY: CPT

## 2021-10-29 PROCEDURE — 80048 BASIC METABOLIC PNL TOTAL CA: CPT

## 2021-10-29 RX ADMIN — ONDANSETRON 4 MG: 2 INJECTION INTRAMUSCULAR; INTRAVENOUS at 12:54

## 2021-10-29 RX ADMIN — ONDANSETRON 4 MG: 2 INJECTION INTRAMUSCULAR; INTRAVENOUS at 04:35

## 2021-10-29 RX ADMIN — Medication 10 ML: at 22:19

## 2021-10-29 RX ADMIN — ACETAMINOPHEN 650 MG: 325 TABLET ORAL at 01:45

## 2021-10-29 RX ADMIN — GADOTERIDOL 14 ML: 279.3 INJECTION, SOLUTION INTRAVENOUS at 07:40

## 2021-10-29 RX ADMIN — ENOXAPARIN SODIUM 40 MG: 40 INJECTION SUBCUTANEOUS at 08:36

## 2021-10-29 RX ADMIN — Medication 10 ML: at 08:37

## 2021-10-29 RX ADMIN — DIVALPROEX SODIUM 250 MG: 250 TABLET, DELAYED RELEASE ORAL at 22:19

## 2021-10-29 RX ADMIN — HYDROCODONE BITARTRATE AND ACETAMINOPHEN 1 TABLET: 5; 325 TABLET ORAL at 23:44

## 2021-10-29 RX ADMIN — ACETAMINOPHEN 650 MG: 325 TABLET ORAL at 10:40

## 2021-10-29 RX ADMIN — HYDROCODONE BITARTRATE AND ACETAMINOPHEN 1 TABLET: 5; 325 TABLET ORAL at 04:35

## 2021-10-29 RX ADMIN — DIVALPROEX SODIUM 250 MG: 250 TABLET, DELAYED RELEASE ORAL at 08:37

## 2021-10-29 ASSESSMENT — PAIN DESCRIPTION - LOCATION
LOCATION: GENERALIZED
LOCATION: HEAD

## 2021-10-29 ASSESSMENT — PAIN DESCRIPTION - DESCRIPTORS
DESCRIPTORS: HEADACHE
DESCRIPTORS: HEADACHE

## 2021-10-29 ASSESSMENT — PAIN SCALES - GENERAL
PAINLEVEL_OUTOF10: 7
PAINLEVEL_OUTOF10: 7
PAINLEVEL_OUTOF10: 0
PAINLEVEL_OUTOF10: 7
PAINLEVEL_OUTOF10: 10
PAINLEVEL_OUTOF10: 8
PAINLEVEL_OUTOF10: 8
PAINLEVEL_OUTOF10: 7

## 2021-10-29 ASSESSMENT — PAIN DESCRIPTION - PAIN TYPE
TYPE: CHRONIC PAIN
TYPE: ACUTE PAIN

## 2021-10-29 ASSESSMENT — PAIN DESCRIPTION - FREQUENCY
FREQUENCY: CONTINUOUS
FREQUENCY: CONTINUOUS

## 2021-10-29 ASSESSMENT — PAIN - FUNCTIONAL ASSESSMENT
PAIN_FUNCTIONAL_ASSESSMENT: PREVENTS OR INTERFERES SOME ACTIVE ACTIVITIES AND ADLS
PAIN_FUNCTIONAL_ASSESSMENT: PREVENTS OR INTERFERES SOME ACTIVE ACTIVITIES AND ADLS

## 2021-10-29 ASSESSMENT — PAIN DESCRIPTION - ORIENTATION
ORIENTATION: LOWER
ORIENTATION: LOWER

## 2021-10-29 ASSESSMENT — PAIN DESCRIPTION - ONSET
ONSET: ON-GOING
ONSET: ON-GOING

## 2021-10-29 NOTE — PROGRESS NOTES
Lamin Alba  Neurology Follow-up  [facility] Neurology    Date of Service: 10/29/2021    Subjective:   CC: Follow up today regarding: Syncope with possible seizure    Events noted. Chart and lab reviewed. Still with a multitude of complaints, number one being lack of sleep. She complains of ongoing diarrhea. ROS : A 10-12 system review obtained and updated today and is unremarkable except as mentioned  in my interval history. family history includes Cancer in her maternal grandfather.     Past Medical History:   Diagnosis Date    Anxiety     Back pain     DDD (degenerative disc disease)     Depression     Headache(784.0) 3/25/2011    Herniated disc     lumbar    New onset seizure ([facility] [address] 9/17/2018     Current Facility-Administered Medications   Medication Dose Route Frequency Provider Last Rate Last Admin    influenza quadrivalent split vaccine (FLUZONE;FLUARIX;FLULAVAL;AFLURIA) injection 0.5 mL  0.5 mL IntraMUSCular Prior to discharge Jony November, APRN - CNP        melatonin tablet 3 mg  3 mg Oral Nightly PRN Irena Crowell MD   3 mg at 10/28/21 2206    sodium chloride flush 0.9 % injection 10 mL  10 mL IntraVENous 2 times per day Irena Crowell MD   10 mL at 10/29/21 0837    sodium chloride flush 0.9 % injection 10 mL  10 mL IntraVENous PRN Irena Crowell MD        0.9 % sodium chloride infusion  25 mL IntraVENous PRN Irena Crowell MD        potassium chloride 10 mEq/100 mL IVPB (Peripheral Line)  10 mEq IntraVENous PRN Irena Crowell MD        magnesium sulfate 2000 mg in 50 mL IVPB premix  2,000 mg IntraVENous PRN Irena Crowell MD        enoxaparin (LOVENOX) injection 40 mg  40 mg SubCUTAneous Daily Balbir López MD   40 mg at 10/29/21 0836    ondansetron (ZOFRAN-ODT) disintegrating tablet 4 mg  4 mg Oral Q8H PRN Irena Crowell MD   4 mg at 10/28/21 1643    Or    ondansetron (ZOFRAN) injection 4 mg  4 mg IntraVENous Q6H PRN Irena Crowell MD   4 mg at 10/29/21 0435    magnesium hydroxide (MILK OF MAGNESIA) 400 MG/5ML suspension 30 mL  30 mL Oral Daily PRN Eulogio Smith MD        acetaminophen (TYLENOL) tablet 650 mg  650 mg Oral Q6H PRN Eulogio Smith MD   650 mg at 10/29/21 0145    Or    acetaminophen (TYLENOL) suppository 650 mg  650 mg Rectal Q6H PRN Eulogio Smith MD        perflutren lipid microspheres (DEFINITY) injection 1.65 mg  1.5 mL IntraVENous ONCE PRN Eulogio Smith MD        divalproex (DEPAKOTE) DR tablet 250 mg  250 mg Oral BID Eulogio Smith MD   250 mg at 10/29/21 0837    HYDROcodone-acetaminophen (NORCO) 5-325 MG per tablet 1 tablet  1 tablet Oral Q6H PRN Eulogio Smith MD   1 tablet at 10/29/21 0435    0.9 % sodium chloride infusion   IntraVENous Continuous Eulogio Smith MD 75 mL/hr at 10/29/21 0531 Rate Verify at 10/29/21 0531     Allergies   Allergen Reactions    Tramadol     Vicodin [Hydrocodone-Acetaminophen] Nausea And Vomiting      reports that she has never smoked. She has never used smokeless tobacco. She reports that she does not drink alcohol and does not use drugs. Objective:  Exam:   Constitutional:   Vitals:    10/28/21 1930 10/29/21 0015 10/29/21 0415 10/29/21 0835   BP: (!) 121/94 (!) 136/100 (!) 135/92 121/84   Pulse: 74 82 79 85   Resp: 18 16 16 14   Temp: 97.7 °F (36.5 °C) 98.2 °F (36.8 °C) 98.2 °F (36.8 °C) 99.1 °F (37.3 °C)   TempSrc: Oral Oral Oral Oral   SpO2: 100% 99% 99% 99%   Weight:       Height:         General appearance:  Normal development. Tearful. Mental Status:   Oriented to person, place, problem, and time. Memory: Good immediate recall. Intact remote memory  Normal attention span and concentration. Language: intact naming, repeating and fluency   Good fund of Knowledge. Cranial Nerves:   II: Visual fields: Full. Pupils: equal, round, reactive to light  III,IV,VI: Extra Ocular Movements are intact.  No nystagmus  V: Facial sensation is intact  VII: Facial strength and movements: intact and symmetric  IX: Palate elevation is symmetric  XI: Shoulder shrug is intact  XII: Tongue movements are normal  Musculoskeletal: Moves all extremities but with very poor effort. Tone: Normal tone. Reflexes: Symmetric 2+ in both arms and legs. Coordination: no pronator drift, no dysmetria with FNF  Sensation: normal to all modalities in both arms and legs. Gait/Posture: did not test gait. Data:  LABS:   Lab Results   Component Value Date     10/29/2021    K 3.7 10/29/2021     10/29/2021    CO2 23 10/29/2021    BUN 6 10/29/2021    CREATININE <0.5 10/29/2021    GFRAA >60 10/29/2021    GFRAA >60 12/02/2012    LABGLOM >60 10/29/2021    GLUCOSE 96 10/29/2021    MG 2.10 10/27/2021    CALCIUM 9.0 10/29/2021     Lab Results   Component Value Date    WBC 4.4 10/29/2021    RBC 4.22 10/29/2021    HGB 12.9 10/29/2021    HCT 37.2 10/29/2021    MCV 88.1 10/29/2021    RDW 12.6 10/29/2021     10/29/2021   No results found for: INR, PROTIME    Neuroimaging was independently reviewed by me and discussed results with the patient  I reviewed blood testing and other test results and discussed results with the patient      Impression:    Syncope vs seizure - CTH and CTA head/neck negative. Events have not been witnessed. Patient was not post-ictal according to EMS who arrived on the scene. Low suspicion for CNS infection. Exam is inconsistent. MRI of brain and EEG are negative. Headache, possible initial episode of migraine. Anxiety and depression  Hx of narcotic abuse    Recommendation    Supportive care for migraine. Avoid narcotics. Continue Depakote 500 mg BID. Monitor on tele. Follow electrolytes. NO driving for 3 months and cleared by physician. May be discharged from a neurological perspective when medically stable. Denton Duarnt, MAGGIE      This dictation was generated by voice recognition computer software.  Although all attempts are made to edit the dictation for accuracy, there may be errors in the transcription that are not intended.

## 2021-10-29 NOTE — PROGRESS NOTES
Physical Therapy  Facility/Department: [facility] B3 - MED SURG  Daily Treatment Note  NAME: Joe Bowser  : 1975  MRN: [id number]    Date of Service: 10/29/2021    Discharge Recommendations:  24 hour supervision or assist, Home with Home health PT   PT Equipment Recommendations  Equipment Needed: No  If pt is unable to be seen after this session, please let this note serve as discharge summary. Please see case management note for discharge disposition. Thank you. Assessment   Body structures, Functions, Activity limitations: Decreased functional mobility ; Decreased balance;Decreased strength;Decreased endurance;Decreased coordination;Decreased vision/visual deficit; Decreased ADL status; Increased pain  Assessment: Pt demonstrating similar mobility compared to yesterday with reported challenges but no LOB. Pt states she gets nausous when the lights are on, but able to get to the bathroom. Many complaints and barriers to activity (pain, nausea, lack of sleep, dizziness). Inconsistent demosntration of strength and coordination as pt was able to transfer to sit EOB with supervision and mostly smooth motion. When PT asked pt to march or demo , pt with increased tremors and effort to complete task. Returned to bed at EOS due to fear of falling out. Will continue to follow to facilitate increased activity. Treatment Diagnosis: decreased mobility  Prognosis: Good  Decision Making: Medium Complexity  PT Education: Goals; General Safety;Gait Training;Disease Specific Education;PT Role;Plan of Care; Functional Mobility Training  Patient Education: Pt expressed understanding on role of PT to assess mobility  Barriers to Learning: cognition, distraction  REQUIRES PT FOLLOW UP: Yes  Activity Tolerance  Activity Tolerance: Patient limited by fatigue;Patient limited by endurance; Patient limited by pain  Activity Tolerance: 100% SpO2 on Ra, 71 HR, 121/79     Patient Diagnosis(es): The primary encounter diagnosis was Syncope and collapse. A diagnosis of Hypokalemia was also pertinent to this visit. has a past medical history of Anxiety, Back pain, DDD (degenerative disc disease), Depression, Headache(784.0), Herniated disc, and New onset seizure ([address] has a past surgical history that includes Anus surgery; Humerus fracture surgery, implant; and  section. Restrictions  Restrictions/Precautions  Restrictions/Precautions: Contact Precautions, Fall Risk, Seizure, Up as Tolerated  Position Activity Restriction  Other position/activity restrictions: [employer]  Subjective              Orientation     Cognition      Objective   Bed mobility  Supine to Sit: Supervision  Sit to Supine: Supervision  Transfers  Sit to Stand: Supervision  Stand to sit: Supervision  Ambulation  Ambulation?: Yes  More Ambulation?: No  Ambulation 1  Surface: level tile  Device: Hand-Held Assist  Assistance: Supervision  Gait Deviations: Slow Ayleen; Shuffles  Distance: 8' x 2 + 15'  Comments: Pt ambulated with supervision to the bathroom without UE support.  Ambulating toward the hallway, pt tightly held to PT's hand with increased tremors and unsteadiness although no LOB     Balance  Posture: Good  Sitting - Static: Good  Sitting - Dynamic: Good  Standing - Static: Good;-  Standing - Dynamic: Fair;+  Exercises  Hip Flexion: x10 B  Hip Abduction: x10 B  Knee Long Arc Quad: x10 B  Ankle Pumps: x10 B         Goals  Short term goals  Time Frame for Short term goals:   Short term goal 1: Pt will complete transfers with Mod I. 10/29: supervision  Short term goal 2: Pt will ambulate x 76' with LRAD with supervision  Short term goal 3: Pt will complete 6 steps with SBA  Short term goal 4: Pt will complete B LE exercises to improve functional mobility by 10/30  Patient Goals   Patient goals : to be indep, to walk    Plan    Plan  Times per week: 3-5x/week  Times per day: Daily  Current Treatment Recommendations: Strengthening, Balance Training, Endurance Training, Functional Mobility Training, Transfer Training, Gait Training, Stair training, Positioning, Equipment Evaluation, Education, & procurement, Neuromuscular Re-education, Home Exercise Program, Safety Education & Training, Patient/Caregiver Education & Training  Safety Devices  Type of devices:  All fall risk precautions in place, Bed alarm in place, Telesitter in use, Call light within reach, Gait belt, Patient at risk for falls, Left in bed, Nurse notified  Restraints  Initially in place: No     Therapy Time   Individual Concurrent Group Co-treatment   Time In 0840         Time Out [address]         Minutes [employer] [address], PT

## 2021-10-29 NOTE — PROGRESS NOTES
Hospitalist Progress Note      PCP: Luz Astudillo DO    Chief Complaint. Patient is a 30-year-old female with past medical history of depression, back pain, new onset seizures who presented to hospital 3 times in 1 week. According to the patient she has been passing out, she mentions she does not feel overall well. Patient mention she is not able to recall the sequence of events, she woke up from sleep and she found herself little confused. She called EMS and was brought to the hospital.  Patient also states she feels nauseated vomiting and having diarrhea, patient mentions she had around more than 10 bowel movements in past 24 hours. Patient otherwise denied chest pain shortness of breath fevers chills. Date of Admission: 10/27/2021      Subjective:   Complains of diarrhea, Nausea, and vomiting denies chest pain, shortness of breath, fever or chills.  mention feels overall worse than yesterday and not wanting to be discharged    Medications:  Reviewed    Infusion Medications    sodium chloride       Scheduled Medications    influenza virus vaccine  0.5 mL IntraMUSCular Prior to discharge    sodium chloride flush  10 mL IntraVENous 2 times per day    enoxaparin  40 mg SubCUTAneous Daily    divalproex  250 mg Oral BID     PRN Meds: melatonin, sodium chloride flush, sodium chloride, potassium chloride, magnesium sulfate, ondansetron **OR** ondansetron, magnesium hydroxide, acetaminophen **OR** acetaminophen, perflutren lipid microspheres, HYDROcodone 5 mg - acetaminophen      Intake/Output Summary (Last 24 hours) at 10/29/2021 1859  Last data filed at 10/29/2021 1353  Gross per 24 hour   Intake 2916.28 ml   Output 0 ml   Net 2916.28 ml       Physical Exam Performed:    BP (!) 132/92   Pulse 78   Temp 98 °F (36.7 °C) (Oral)   Resp 16   Ht 5' 11\" (1.803 m)   Wt 160 lb (72.6 kg)   LMP  (LMP Unknown)   SpO2 99%   BMI 22.32 kg/m²     General appearance: NAD  HEENT:  Conjunctivae/corneas clear.  Neck: Supple, with full range of motion. Respiratory:  Normal respiratory effort. Clear to auscultation, bilaterally without Rales/Wheezes/Rhonchi. Cardiovascular: Regular rate and rhythm with normal S1/S2 without murmurs or rubs  Abdomen: Soft, non-tender, non-distended, normal bowel sounds. Musculoskeletal: No cyanosis or edema bilaterally  Neurologic:  without any focal sensory/motor deficits. grossly non-focal.  Psychiatric: Alert and oriented, Normal mood  Peripheral Pulses: +2 palpable, equal bilaterally       Labs:   Recent Labs     10/27/21  0615 10/28/21  0742 10/29/21  0551   WBC 6.8 5.5 4.4   HGB 14.9 13.8 12.9   HCT 43.9 39.9 37.2    237 222     Recent Labs     10/27/21  0615 10/28/21  0742 10/29/21  0551    139 140   K 3.2* 3.8 3.7    105 106   CO2 22 21 23   BUN 12 10 6*   CREATININE 0.6 <0.5* <0.5*   CALCIUM 9.7 9.5 9.0     Recent Labs     10/27/21  0615   AST 27   ALT 42*   BILITOT 0.7   ALKPHOS 86     No results for input(s): INR in the last 72 hours. Recent Labs     10/27/21  0615 10/28/21  0742   CKTOTAL  --  23*   TROPONINI <0.01  --        Urinalysis:      Lab Results   Component Value Date    NITRU Negative 10/27/2021    WBCUA 15-20 05/09/2010    BACTERIA 3+ 05/09/2010    BLOODU Negative 10/27/2021    SPECGRAV 1.010 10/27/2021    GLUCOSEU Negative 10/27/2021    GLUCOSEU Neg 05/09/2010       Radiology:  MRI BRAIN W WO CONTRAST   Final Result   No acute intracranial abnormality. No acute infarct. CTA HEAD NECK W CONTRAST   Final Result   Unremarkable CTA of the head and neck. CT ABDOMEN PELVIS WO CONTRAST Additional Contrast? None   Final Result   No acute findings to the abdomen or pelvis. No findings to explain the   patient's symptoms. CT HEAD WO CONTRAST   Final Result   No acute intracranial abnormality. Mild paranasal sinus disease         XR CHEST PORTABLE   Final Result   No evidence of acute cardiopulmonary process. Assessment/Plan:    Active Hospital Problems    Diagnosis     Syncope and collapse [R55]      Patient is a 48-year-old female with past medical history of depression, back pain, new onset seizures who presented to hospital 3 times in 1 week. According to the patient she has been passing out, she mentions she does not feel overall well. Patient mention she is not able to recall the sequence of events, she woke up from sleep and she found herself little confused. She called EMS and was brought to the hospital.  Patient also states she feels nauseated vomiting and having diarrhea, patient mentions she had around more than 10 bowel movements in past 24 hours. Patient otherwise denied chest pain shortness of breath fevers chills.     Assessment  Syncope, fall, rule out breakthrough seizures  Nausea vomiting diarrhea, suspect viral gastritis  History of depression  New onset seizures     Plan  DC IV fluid therapy, MRI negative for acute abnormality, CT head performed in ED-unremarkable for acute intracranial process, CT abdomen and pelvis performed in ED-unremarkable, CT head and neck-negative, will consult neurology for further evaluation - plan for EEG per neruology, resume home Depakote  Diet: ADULT DIET;  Regular  Code Status: Full Code    PT/OT Eval Status: ordered    Dispo - Complains of diarrhea, Nausea, continue to monitor in-house today, dc tomorrow, check C - diff    Lance Fernandez MD

## 2021-10-29 NOTE — CARE COORDINATION
[facility]    Referral received from CM to follow for home care services.       Artgeorgi Chris, [address]     [phone]     Patient no longer a patient for dr Roberta Bliss;  [facility] unable to accept patient without a PCP to follow for home care orders    [facility] CM notified she will follow up with patient    Brittany Alexandre RN, BSN CTN  [facility] [phone]

## 2021-10-29 NOTE — CARE COORDINATION
Care being managed by [facility], Neuro. LOS 2 in Obs. Has therapy recs for Elijah 78- pt agreeable to referral to on site liaison. [facility] can accept if pt either has PCP or makes appt at [facility] clinic. Call placed to [facility] and referral made. Will instruct pt. Continue to follow.   Lore Groves RN

## 2021-10-29 NOTE — TELEPHONE ENCOUNTER
angeles knapp. Called to refer this patient with no PCP. She was still IP at time of call. Please follow up.

## 2021-10-30 VITALS
WEIGHT: 160 LBS | DIASTOLIC BLOOD PRESSURE: 98 MMHG | TEMPERATURE: 97.4 F | HEART RATE: 74 BPM | BODY MASS INDEX: 22.4 KG/M2 | HEIGHT: 71 IN | OXYGEN SATURATION: 100 % | SYSTOLIC BLOOD PRESSURE: 128 MMHG | RESPIRATION RATE: 20 BRPM

## 2021-10-30 LAB
ANION GAP SERPL CALCULATED.3IONS-SCNC: 12 MMOL/L (ref 3–16)
BUN BLDV-MCNC: 5 MG/DL (ref 7–20)
CALCIUM SERPL-MCNC: 9.4 MG/DL (ref 8.3–10.6)
CHLORIDE BLD-SCNC: 103 MMOL/L (ref 99–110)
CO2: 23 MMOL/L (ref 21–32)
CREAT SERPL-MCNC: <0.5 MG/DL (ref 0.6–1.1)
GFR AFRICAN AMERICAN: >60
GFR NON-AFRICAN AMERICAN: >60
GLUCOSE BLD-MCNC: 98 MG/DL (ref 70–99)
HCT VFR BLD CALC: 39.7 % (ref 36–48)
HEMOGLOBIN: 13.8 G/DL (ref 12–16)
MCH RBC QN AUTO: 30.4 PG (ref 26–34)
MCHC RBC AUTO-ENTMCNC: 34.8 G/DL (ref 31–36)
MCV RBC AUTO: 87.3 FL (ref 80–100)
PDW BLD-RTO: 12.6 % (ref 12.4–15.4)
PLATELET # BLD: 251 K/UL (ref 135–450)
PMV BLD AUTO: 7.9 FL (ref 5–10.5)
POTASSIUM REFLEX MAGNESIUM: 3.7 MMOL/L (ref 3.5–5.1)
RBC # BLD: 4.55 M/UL (ref 4–5.2)
SODIUM BLD-SCNC: 138 MMOL/L (ref 136–145)
WBC # BLD: 5.8 K/UL (ref 4–11)

## 2021-10-30 PROCEDURE — 96376 TX/PRO/DX INJ SAME DRUG ADON: CPT

## 2021-10-30 PROCEDURE — 96372 THER/PROPH/DIAG INJ SC/IM: CPT

## 2021-10-30 PROCEDURE — 6370000000 HC RX 637 (ALT 250 FOR IP): Performed by: INTERNAL MEDICINE

## 2021-10-30 PROCEDURE — 80048 BASIC METABOLIC PNL TOTAL CA: CPT

## 2021-10-30 PROCEDURE — 85027 COMPLETE CBC AUTOMATED: CPT

## 2021-10-30 PROCEDURE — G0378 HOSPITAL OBSERVATION PER HR: HCPCS

## 2021-10-30 PROCEDURE — 36415 COLL VENOUS BLD VENIPUNCTURE: CPT

## 2021-10-30 PROCEDURE — 6360000002 HC RX W HCPCS: Performed by: INTERNAL MEDICINE

## 2021-10-30 PROCEDURE — 2580000003 HC RX 258: Performed by: INTERNAL MEDICINE

## 2021-10-30 RX ORDER — LOPERAMIDE HYDROCHLORIDE 2 MG/1
2 CAPSULE ORAL 4 TIMES DAILY PRN
Qty: 14 CAPSULE | Refills: 0 | Status: SHIPPED | OUTPATIENT
Start: 2021-10-30 | End: 2021-11-09

## 2021-10-30 RX ORDER — LANOLIN ALCOHOL/MO/W.PET/CERES
3 CREAM (GRAM) TOPICAL NIGHTLY PRN
Qty: 21 TABLET | Refills: 1 | Status: SHIPPED | OUTPATIENT
Start: 2021-10-30 | End: 2021-11-20

## 2021-10-30 RX ADMIN — DIVALPROEX SODIUM 250 MG: 250 TABLET, DELAYED RELEASE ORAL at 08:47

## 2021-10-30 RX ADMIN — HYDROCODONE BITARTRATE AND ACETAMINOPHEN 1 TABLET: 5; 325 TABLET ORAL at 12:14

## 2021-10-30 RX ADMIN — ENOXAPARIN SODIUM 40 MG: 40 INJECTION SUBCUTANEOUS at 08:47

## 2021-10-30 RX ADMIN — Medication 10 ML: at 08:47

## 2021-10-30 RX ADMIN — HYDROCODONE BITARTRATE AND ACETAMINOPHEN 1 TABLET: 5; 325 TABLET ORAL at 06:07

## 2021-10-30 RX ADMIN — ONDANSETRON 4 MG: 2 INJECTION INTRAMUSCULAR; INTRAVENOUS at 04:27

## 2021-10-30 ASSESSMENT — PAIN SCALES - GENERAL
PAINLEVEL_OUTOF10: 2
PAINLEVEL_OUTOF10: 2
PAINLEVEL_OUTOF10: 7
PAINLEVEL_OUTOF10: 7
PAINLEVEL_OUTOF10: 5
PAINLEVEL_OUTOF10: 3

## 2021-10-30 ASSESSMENT — PAIN DESCRIPTION - LOCATION
LOCATION: HEAD;BACK
LOCATION: ABDOMEN;HEAD

## 2021-10-30 ASSESSMENT — PAIN DESCRIPTION - PAIN TYPE
TYPE: ACUTE PAIN
TYPE: CHRONIC PAIN

## 2021-10-30 NOTE — PROGRESS NOTES
Pt refused bed alarms at this time. Pt told writer that the doctor yesterday stated she could get up on her own. I spoke with patient and educated her on the reasoning and how the scoring works. Pt states she is confused and this is making her anxiety worse and stated to turn them off for now. Writer told pt to call if she changes her mind.

## 2021-10-30 NOTE — DISCHARGE INSTR - COC
Continuity of Care Form    Patient Name: Elina Rater   :  1975  MRN:  [id number]    Admit date:  10/27/2021  Discharge date:  10/30/21      Code Status Order: Full Code   Advance Directives:      Admitting Physician:  Julio Weiss MD  PCP: Tam Branch DO    Discharging Nurse: [facility] Unit/Room#: 3395/3431-89  Discharging Unit Phone Number:     Emergency Contact:   Extended Emergency Contact Information  Primary Emergency Contact: Abdirashid Pastor, [address] Phone: [phone]  Relation: Child  Secondary Emergency Contact: Collette [address] Home Phone: [phone]  Relation: Child    Past Surgical History:  Past Surgical History:   Procedure Laterality Date    ANUS SURGERY      hemerrhoid surgery x 5     SECTION      x2    HUMERUS FRACTURE SURGERY WITH IMPLANT         Immunization History: There is no immunization history for the selected administration types on file for this patient. Active Problems:  Patient Active Problem List   Diagnosis Code    Anxiety F41.9    Depression F32. A    Arm pain M79.603    Headache R51.9    DJD (degenerative joint disease), lumbar M47.816    Insomnia G47.00    History of gestational diabetes Z86.32    New onset seizure ([facility] [address] R56.9    10 weeks gestation of pregnancy Z3A.10    Syncope and collapse R55       Isolation/Infection:   Isolation            C Diff Contact          Patient Infection Status       Infection Onset Added Last Indicated Last Indicated By Review Planned Expiration Resolved Resolved By    C-diff Rule Out 10/28/21 10/28/21 10/28/21 Clostridium difficile toxin/antigen (Ordered)        Resolved    COVID-19 Rule Out 10/27/21 10/27/21 10/27/21 COVID-19, Rapid (Ordered)   10/27/21 Rule-Out Test Resulted            Nurse Assessment:  Last Vital Signs: BP (!) 128/98   Pulse 74   Temp 97.4 °F (36.3 °C) (Oral)   Resp 20   Ht 5' 11\" (1.803 m)   Wt 160 lb (72.6 kg)   LMP  (LMP Unknown)   SpO2 100%   BMI 22.32 kg/m²     Last documented pain score (0-10 scale): Pain Level: 5  Last Weight:   Wt Readings from Last 1 Encounters:   10/27/21 160 lb (72.6 kg)     Mental Status:  oriented and alert    IV Access:  - None    Nursing Mobility/ADLs:  Walking   Independent  Transfer  Independent  Bathing  Assisted  Dressing  Independent  [address]  Assisted  Med Delivery   whole    Wound Care Documentation and Therapy:        Elimination:  Continence: Bowel: Yes  Bladder: Yes  Urinary Catheter: None   Colostomy/Ileostomy/Ileal Conduit: No       Date of Last BM: 10/30    Intake/Output Summary (Last 24 hours) at 10/30/2021 1315  Last data filed at 10/29/2021 1353  Gross per 24 hour   Intake 50 ml   Output --   Net 50 ml     I/O last 3 completed shifts: In: 170 [P.O.:170]  Out: -     Safety Concerns: At Risk for Falls and History of Seizures    Impairments/Disabilities:      None    Nutrition Therapy:  Current Nutrition Therapy:   - Oral Diet:  General    Routes of Feeding: Oral  Liquids: No Restrictions  Daily Fluid Restriction: no  Last Modified Barium Swallow with Video (Video Swallowing Test): not done    Treatments at the Time of Hospital Discharge:   Respiratory Treatments: NA  Oxygen Therapy:  is not on home oxygen therapy.   Ventilator:    - No ventilator support    Rehab Therapies: Physical Therapy, Occupational Therapy and Speech/Language Therapy  Weight Bearing Status/Restrictions: No weight bearing restirctions  Other Medical Equipment (for information only, NOT a DME order):  bath bench  Other Treatments: NA    Patient's personal belongings (please select all that are sent with patient):  None    RN SIGNATURE:  Electronically signed by Loraine Cardona RN on 10/30/21 at 1:38 PM EDT    CASE MANAGEMENT/SOCIAL WORK SECTION    Inpatient Status Date: ***    Readmission Risk Assessment Score:  Readmission Risk              Risk of Unplanned Readmission:  0           Discharging to Facility/ Agency   Name:   Address:  Phone:  Fax:    Dialysis Facility (if applicable)   Name:  Address:  Dialysis Schedule:  Phone:  Fax:    / signature: {Esignature:[id number]}    PHYSICIAN SECTION    Prognosis: Fair    Condition at Discharge: Stable    Rehab Potential (if transferring to Rehab): Fair    Recommended Labs or Other Treatments After Discharge: follow up with your PCP    Physician Certification: I certify the above information and transfer of Jose Jimenez  is necessary for the continuing treatment of the diagnosis listed and that she requires Home Care for greater 30 days.      Update Admission H&P: No change in H&P    PHYSICIAN SIGNATURE:  Electronically signed by Noe Velasco MD on 10/30/21 at 3:59 PM EDT

## 2021-10-30 NOTE — CARE COORDINATION
CASE MANAGEMENT DISCHARGE SUMMARY      Discharge to: home with [facility]    IMM given: (date)     New Durable Medical Equipment ordered/agency: na    Transportation:    Family/car: private      Confirmed discharge plan with:RN. [facility]     Patient: yes     Facility/Agency, name:  Radha Gloria RN, name: Yun Pierce    Note: Discharging nurse to complete SLIME, reconcile AVS, and place final copy with patient's discharge packet. RN to ensure that written prescriptions for  Level II medications are sent with patient to the facility as per protocol.

## 2021-12-02 NOTE — DISCHARGE SUMMARY
Hospital Medicine Discharge Summary    Patient ID: Júnior Thrasher      Patient's PCP: Wolfgang Daugherty DO    Admit Date: 10/27/2021     Discharge Date: 10/30/2021    Admitting Physician: Addie Zhou MD     Discharge Physician: Addie Zhou MD     Discharge Diagnoses: Active Hospital Problems    Diagnosis Date Noted    Syncope and collapse [R55] 10/27/2021       The patient was seen and examined on day of discharge and this discharge summary is in conjunction with any daily progress note from day of discharge. Condition at discharge - stable    Hospital Course: patient seen and evaluated on the day of discharge. Patient informed about following up with appointments. Patient verbalized understanding for follow-up appointments. The patient and / or the family were informed of the results of tests, a time was given to answer questions, a plan was proposed and they agreed with plan. Medical reconciliation performed. Patient discharged stable condition.       Patient is a 48-year-old female with past medical history of depression, back pain, new onset seizures who presented to hospital 3 times in 1 week.  According to the patient she has been passing out, she mentions she does not feel overall well.  Patient mention she is not able to recall the sequence of events, she woke up from sleep and she found herself little confused.  She called EMS and was brought to the hospital.  Patient also states she feels nauseated vomiting and having diarrhea, patient mentions she had around more than 10 bowel movements in past 24 hours.  Patient otherwise denied chest pain shortness of breath fevers chills.     Assessment  Syncope, fall, rule out breakthrough seizures  Nausea vomiting diarrhea, suspect viral gastritis  History of depression  New onset seizures     Plan  DC IV fluid therapy, MRI negative for acute abnormality, CT head performed in ED-unremarkable for acute intracranial process, CT abdomen and pelvis performed in ED-unremarkable, CT head and neck-negative, will consult neurology for further evaluation - plan for EEG per neruology, resume home Depakote  Diet: ADULT DIET; Regular  Code Status: Full Code     PT/OT Eval Status: ordered          Exam:     BP (!) 128/98   Pulse 74   Temp 97.4 °F (36.3 °C) (Oral)   Resp 20   Ht 5' 11\" (1.803 m)   Wt 160 lb (72.6 kg)   LMP  (LMP Unknown)   SpO2 100%   BMI 22.32 kg/m²     General appearance: No apparent distress  HEENT:  Conjunctivae/corneas clear. Neck: Supple, No jugular venous distention. Respiratory:  Normal respiratory effort. Clear to auscultation, bilaterally without Rales/Wheezes/Rhonchi. Cardiovascular: Regular rate and rhythm with normal S1/S2 without murmurs, rubs or gallops. Abdomen: Soft, non-tender, non-distended, normal bowel sounds. Musculoskelatal: No clubbing, cyanosis or edema bilaterally. Skin: Skin color, texture, turgor normal.   Neurologic: no focal neurologic deficits. grossly non-focal.  Psychiatric: Alert and oriented, normal mood    Consults:     IP CONSULT TO NEUROLOGY  IP CONSULT TO PHARMACY      Code Status:  Prior    Activity: activity as tolerated    Labs:  For convenience and continuity at follow-up the following most recent labs are provided:      CBC:    Lab Results   Component Value Date    WBC 5.8 10/30/2021    HGB 13.8 10/30/2021    HCT 39.7 10/30/2021     10/30/2021       Renal:    Lab Results   Component Value Date     10/30/2021    K 3.7 10/30/2021     10/30/2021    CO2 23 10/30/2021    BUN 5 10/30/2021    CREATININE <0.5 10/30/2021    CALCIUM 9.4 10/30/2021       Discharge Medications:     Discharge Medication List as of 10/30/2021  4:22 PM           Details   melatonin 3 MG TABS tablet Take 1 tablet by mouth nightly as needed (sleep), Disp-21 tablet, R-1Normal      loperamide (RA ANTI-DIARRHEAL) 2 MG capsule Take 1 capsule by mouth 4 times daily as needed for Diarrhea, Disp-14 capsule, R-0Normal              Details   divalproex (DEPAKOTE) 250 MG DR tablet Take 1 tablet by mouth 2 times daily, Disp-90 tablet, R-3Normal      potassium chloride (KLOR-CON M) 10 MEQ extended release tablet Take 1 tablet by mouth 2 times daily for 10 days, Disp-20 tablet, R-0Normal      Prenatal Vit-Fe Fumarate-FA (PRENATAL VITAMINS) 28-0.8 MG TABS Historical Med      promethazine (PHENERGAN) 25 MG tablet Historical Med             Time Spent on discharge is more than 30 mints in the examination, evaluation, counseling and review of medications and discharge plan. Signed:    Connie Tirado MD   12/2/2021      Thank you Sarah Shepard DO for the opportunity to be involved in this patient's care. If you have any questions or concerns please feel free to contact me at [phone].
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
## 2025-07-26 SDOH — HEALTH STABILITY: PHYSICAL HEALTH: ON AVERAGE, HOW MANY MINUTES DO YOU ENGAGE IN EXERCISE AT THIS LEVEL?: 40 MIN

## 2025-07-26 SDOH — HEALTH STABILITY: PHYSICAL HEALTH: ON AVERAGE, HOW MANY DAYS PER WEEK DO YOU ENGAGE IN MODERATE TO STRENUOUS EXERCISE (LIKE A BRISK WALK)?: 1 DAY

## 2025-07-29 ENCOUNTER — OFFICE VISIT (OUTPATIENT)
Dept: FAMILY MEDICINE CLINIC | Age: 50
End: 2025-07-29
Payer: MEDICAID

## 2025-07-29 VITALS
WEIGHT: 232.4 LBS | OXYGEN SATURATION: 97 % | DIASTOLIC BLOOD PRESSURE: 80 MMHG | BODY MASS INDEX: 32.53 KG/M2 | SYSTOLIC BLOOD PRESSURE: 120 MMHG | HEART RATE: 55 BPM | TEMPERATURE: 97.2 F | HEIGHT: 71 IN

## 2025-07-29 DIAGNOSIS — Z87.898 HISTORY OF SEIZURE: ICD-10-CM

## 2025-07-29 DIAGNOSIS — Z00.00 ENCOUNTER FOR SCREENING AND PREVENTATIVE CARE: ICD-10-CM

## 2025-07-29 DIAGNOSIS — K92.1 BLOOD IN STOOL: ICD-10-CM

## 2025-07-29 DIAGNOSIS — F19.90 SUBSTANCE USE DISORDER: ICD-10-CM

## 2025-07-29 DIAGNOSIS — F43.10 PTSD (POST-TRAUMATIC STRESS DISORDER): Primary | ICD-10-CM

## 2025-07-29 DIAGNOSIS — Z12.31 BREAST CANCER SCREENING BY MAMMOGRAM: ICD-10-CM

## 2025-07-29 DIAGNOSIS — F39 MOOD DISORDER: ICD-10-CM

## 2025-07-29 PROCEDURE — 1036F TOBACCO NON-USER: CPT | Performed by: FAMILY MEDICINE

## 2025-07-29 PROCEDURE — 99204 OFFICE O/P NEW MOD 45 MIN: CPT | Performed by: FAMILY MEDICINE

## 2025-07-29 PROCEDURE — G8427 DOCREV CUR MEDS BY ELIG CLIN: HCPCS | Performed by: FAMILY MEDICINE

## 2025-07-29 PROCEDURE — 3017F COLORECTAL CA SCREEN DOC REV: CPT | Performed by: FAMILY MEDICINE

## 2025-07-29 PROCEDURE — G8417 CALC BMI ABV UP PARAM F/U: HCPCS | Performed by: FAMILY MEDICINE

## 2025-07-29 RX ORDER — PRAZOSIN HYDROCHLORIDE 1 MG/1
2 CAPSULE ORAL NIGHTLY
COMMUNITY
Start: 2025-07-29

## 2025-07-29 RX ORDER — QUETIAPINE FUMARATE 400 MG/1
800 TABLET, FILM COATED ORAL
COMMUNITY
Start: 2025-07-29

## 2025-07-29 RX ORDER — CARIPRAZINE 1.5 MG/1
1.5 CAPSULE, GELATIN COATED ORAL NIGHTLY
COMMUNITY

## 2025-07-29 RX ORDER — QUETIAPINE FUMARATE 400 MG/1
400 TABLET, FILM COATED ORAL 2 TIMES DAILY
COMMUNITY
Start: 2025-07-03 | End: 2025-07-29

## 2025-07-29 RX ORDER — PRAZOSIN HYDROCHLORIDE 1 MG/1
5 CAPSULE ORAL NIGHTLY
COMMUNITY
End: 2025-07-29

## 2025-07-29 RX ORDER — LUMATEPERONE 42 MG/1
42 CAPSULE ORAL DAILY
COMMUNITY

## 2025-07-29 RX ORDER — CLONIDINE HYDROCHLORIDE 0.1 MG/1
0.3 TABLET ORAL 2 TIMES DAILY
COMMUNITY
Start: 2025-07-03 | End: 2025-07-29

## 2025-07-29 RX ORDER — PROPRANOLOL HCL 20 MG
20 TABLET ORAL 2 TIMES DAILY
COMMUNITY
Start: 2025-07-03

## 2025-07-29 RX ORDER — CLONIDINE HYDROCHLORIDE 0.1 MG/1
0.1 TABLET ORAL
COMMUNITY
Start: 2025-07-29

## 2025-07-29 SDOH — ECONOMIC STABILITY: FOOD INSECURITY: WITHIN THE PAST 12 MONTHS, YOU WORRIED THAT YOUR FOOD WOULD RUN OUT BEFORE YOU GOT MONEY TO BUY MORE.: NEVER TRUE

## 2025-07-29 SDOH — ECONOMIC STABILITY: FOOD INSECURITY: WITHIN THE PAST 12 MONTHS, THE FOOD YOU BOUGHT JUST DIDN'T LAST AND YOU DIDN'T HAVE MONEY TO GET MORE.: NEVER TRUE

## 2025-07-29 ASSESSMENT — PATIENT HEALTH QUESTIONNAIRE - PHQ9
3. TROUBLE FALLING OR STAYING ASLEEP: NEARLY EVERY DAY
SUM OF ALL RESPONSES TO PHQ QUESTIONS 1-9: 21
8. MOVING OR SPEAKING SO SLOWLY THAT OTHER PEOPLE COULD HAVE NOTICED. OR THE OPPOSITE, BEING SO FIGETY OR RESTLESS THAT YOU HAVE BEEN MOVING AROUND A LOT MORE THAN USUAL: MORE THAN HALF THE DAYS
SUM OF ALL RESPONSES TO PHQ QUESTIONS 1-9: 21
SUM OF ALL RESPONSES TO PHQ QUESTIONS 1-9: 19
SUM OF ALL RESPONSES TO PHQ QUESTIONS 1-9: 21
5. POOR APPETITE OR OVEREATING: MORE THAN HALF THE DAYS
9. THOUGHTS THAT YOU WOULD BE BETTER OFF DEAD, OR OF HURTING YOURSELF: MORE THAN HALF THE DAYS
2. FEELING DOWN, DEPRESSED OR HOPELESS: MORE THAN HALF THE DAYS
4. FEELING TIRED OR HAVING LITTLE ENERGY: MORE THAN HALF THE DAYS
6. FEELING BAD ABOUT YOURSELF - OR THAT YOU ARE A FAILURE OR HAVE LET YOURSELF OR YOUR FAMILY DOWN: NEARLY EVERY DAY
1. LITTLE INTEREST OR PLEASURE IN DOING THINGS: MORE THAN HALF THE DAYS
7. TROUBLE CONCENTRATING ON THINGS, SUCH AS READING THE NEWSPAPER OR WATCHING TELEVISION: NEARLY EVERY DAY

## 2025-07-29 ASSESSMENT — COLUMBIA-SUICIDE SEVERITY RATING SCALE - C-SSRS
2. HAVE YOU ACTUALLY HAD ANY THOUGHTS OF KILLING YOURSELF?: NO
6. HAVE YOU EVER DONE ANYTHING, STARTED TO DO ANYTHING, OR PREPARED TO DO ANYTHING TO END YOUR LIFE?: NO
1. WITHIN THE PAST MONTH, HAVE YOU WISHED YOU WERE DEAD OR WISHED YOU COULD GO TO SLEEP AND NOT WAKE UP?: NO

## 2025-07-29 NOTE — PATIENT INSTRUCTIONS
[facility]  [address],  [address],  [place] [address]  Phone: [phone]     [address], [address]  [place] [id number]  Phone: [phone]     Ant Richardson MD  [address]  [address]  [place] [address]   Phone: [phone]     MARITZA Abad, PMHNP  In person and virtual  [address] [place] [id number]  Phone: [phone]
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
## 2025-07-30 ENCOUNTER — PATIENT MESSAGE (OUTPATIENT)
Dept: FAMILY MEDICINE CLINIC | Age: 50
End: 2025-07-30

## 2025-07-30 PROBLEM — Z87.898 HISTORY OF SEIZURE: Status: ACTIVE | Noted: 2025-07-30

## 2025-07-30 PROBLEM — K92.1 BLOOD IN STOOL: Status: ACTIVE | Noted: 2025-07-30

## 2025-07-30 PROBLEM — F43.10 PTSD (POST-TRAUMATIC STRESS DISORDER): Status: ACTIVE | Noted: 2025-07-30

## 2025-07-30 PROBLEM — E78.5 DYSLIPIDEMIA: Status: ACTIVE | Noted: 2025-07-30

## 2025-07-30 PROBLEM — F19.90 SUBSTANCE USE DISORDER: Status: ACTIVE | Noted: 2025-07-30

## 2025-07-30 LAB
ALBUMIN SERPL-MCNC: 4.4 G/DL (ref 3.4–5)
ALBUMIN/GLOB SERPL: 1.8 {RATIO} (ref 1.1–2.2)
ALP SERPL-CCNC: 107 U/L (ref 40–129)
ALT SERPL-CCNC: 24 U/L (ref 10–40)
ANION GAP SERPL CALCULATED.3IONS-SCNC: 11 MMOL/L (ref 3–16)
AST SERPL-CCNC: 26 U/L (ref 15–37)
BILIRUB SERPL-MCNC: 0.3 MG/DL (ref 0–1)
BUN SERPL-MCNC: 8 MG/DL (ref 7–20)
CALCIUM SERPL-MCNC: 9.7 MG/DL (ref 8.3–10.6)
CHLORIDE SERPL-SCNC: 103 MMOL/L (ref 99–110)
CHOLEST SERPL-MCNC: 281 MG/DL (ref 0–199)
CO2 SERPL-SCNC: 27 MMOL/L (ref 21–32)
CREAT SERPL-MCNC: 0.8 MG/DL (ref 0.6–1.1)
DEPRECATED RDW RBC AUTO: 12.8 % (ref 12.4–15.4)
EST. AVERAGE GLUCOSE BLD GHB EST-MCNC: 91.1 MG/DL
GFR SERPLBLD CREATININE-BSD FMLA CKD-EPI: 89 ML/MIN/{1.73_M2}
GLUCOSE SERPL-MCNC: 93 MG/DL (ref 70–99)
HBA1C MFR BLD: 4.8 %
HCT VFR BLD AUTO: 41.6 % (ref 36–48)
HDLC SERPL-MCNC: 43 MG/DL (ref 40–60)
HGB BLD-MCNC: 14.2 G/DL (ref 12–16)
LDLC SERPL CALC-MCNC: 191 MG/DL
MCH RBC QN AUTO: 30.6 PG (ref 26–34)
MCHC RBC AUTO-ENTMCNC: 34.3 G/DL (ref 31–36)
MCV RBC AUTO: 89.1 FL (ref 80–100)
PLATELET # BLD AUTO: 260 K/UL (ref 135–450)
PMV BLD AUTO: 8.6 FL (ref 5–10.5)
POTASSIUM SERPL-SCNC: 4.7 MMOL/L (ref 3.5–5.1)
PROT SERPL-MCNC: 6.9 G/DL (ref 6.4–8.2)
RBC # BLD AUTO: 4.66 M/UL (ref 4–5.2)
SODIUM SERPL-SCNC: 141 MMOL/L (ref 136–145)
TRIGL SERPL-MCNC: 237 MG/DL (ref 0–150)
TSH SERPL DL<=0.005 MIU/L-ACNC: 2.03 UIU/ML (ref 0.27–4.2)
VLDLC SERPL CALC-MCNC: 47 MG/DL
WBC # BLD AUTO: 6 K/UL (ref 4–11)

## 2025-07-30 RX ORDER — BUPRENORPHINE AND NALOXONE 8; 2 MG/1; MG/1
2 FILM, SOLUBLE BUCCAL; SUBLINGUAL DAILY
COMMUNITY
Start: 2025-07-30

## 2025-08-05 ENCOUNTER — TELEPHONE (OUTPATIENT)
Dept: FAMILY MEDICINE CLINIC | Age: 50
End: 2025-08-05

## 2025-08-15 ENCOUNTER — HOSPITAL ENCOUNTER (OUTPATIENT)
Dept: WOMENS IMAGING | Age: 50
Discharge: HOME | End: 2025-08-15
Attending: FAMILY MEDICINE
Payer: MEDICAID

## 2025-08-15 VITALS — BODY MASS INDEX: 32.2 KG/M2 | HEIGHT: 71 IN | WEIGHT: 230 LBS

## 2025-08-15 DIAGNOSIS — Z12.31 BREAST CANCER SCREENING BY MAMMOGRAM: ICD-10-CM

## 2025-08-15 PROCEDURE — 77063 BREAST TOMOSYNTHESIS BI: CPT

## 2025-08-20 ENCOUNTER — OFFICE VISIT (OUTPATIENT)
Dept: FAMILY MEDICINE CLINIC | Age: 50
End: 2025-08-20
Payer: MEDICAID

## 2025-08-20 VITALS
TEMPERATURE: 97.2 F | HEIGHT: 71 IN | SYSTOLIC BLOOD PRESSURE: 100 MMHG | HEART RATE: 74 BPM | BODY MASS INDEX: 31.86 KG/M2 | OXYGEN SATURATION: 97 % | WEIGHT: 227.6 LBS | DIASTOLIC BLOOD PRESSURE: 70 MMHG

## 2025-08-20 PROCEDURE — G8427 DOCREV CUR MEDS BY ELIG CLIN: HCPCS | Performed by: FAMILY MEDICINE

## 2025-08-20 PROCEDURE — 99214 OFFICE O/P EST MOD 30 MIN: CPT | Performed by: FAMILY MEDICINE

## 2025-08-20 PROCEDURE — 1036F TOBACCO NON-USER: CPT | Performed by: FAMILY MEDICINE

## 2025-08-20 PROCEDURE — 3017F COLORECTAL CA SCREEN DOC REV: CPT | Performed by: FAMILY MEDICINE

## 2025-08-20 PROCEDURE — G8417 CALC BMI ABV UP PARAM F/U: HCPCS | Performed by: FAMILY MEDICINE

## 2025-08-20 RX ORDER — BUSPIRONE HYDROCHLORIDE 10 MG/1
20 TABLET ORAL NIGHTLY
COMMUNITY
Start: 2025-07-31

## 2025-08-20 RX ORDER — PROMETHAZINE HYDROCHLORIDE 25 MG/1
25 TABLET ORAL EVERY 8 HOURS PRN
Qty: 20 TABLET | Refills: 0 | Status: SHIPPED | OUTPATIENT
Start: 2025-08-20

## 2025-08-20 RX ORDER — BUPROPION HCL 150 MG
450 TABLET, EXTENDED RELEASE 24 HR ORAL EVERY MORNING
COMMUNITY
Start: 2021-03-01

## 2025-08-20 RX ORDER — LINACLOTIDE 145 UG/1
145 CAPSULE, GELATIN COATED ORAL
COMMUNITY
Start: 2025-08-18

## 2025-08-20 RX ORDER — METFORMIN HYDROCHLORIDE 500 MG/1
500 TABLET, EXTENDED RELEASE ORAL
Qty: 90 TABLET | Refills: 3 | Status: SHIPPED | OUTPATIENT
Start: 2025-08-20

## 2025-08-20 SDOH — ECONOMIC STABILITY: FOOD INSECURITY: WITHIN THE PAST 12 MONTHS, THE FOOD YOU BOUGHT JUST DIDN'T LAST AND YOU DIDN'T HAVE MONEY TO GET MORE.: NEVER TRUE

## 2025-08-20 SDOH — ECONOMIC STABILITY: FOOD INSECURITY: WITHIN THE PAST 12 MONTHS, YOU WORRIED THAT YOUR FOOD WOULD RUN OUT BEFORE YOU GOT MONEY TO BUY MORE.: NEVER TRUE
